# Patient Record
Sex: MALE | Race: BLACK OR AFRICAN AMERICAN | NOT HISPANIC OR LATINO | Employment: FULL TIME | URBAN - METROPOLITAN AREA
[De-identification: names, ages, dates, MRNs, and addresses within clinical notes are randomized per-mention and may not be internally consistent; named-entity substitution may affect disease eponyms.]

---

## 2019-09-18 ENCOUNTER — APPOINTMENT (EMERGENCY)
Dept: RADIOLOGY | Facility: HOSPITAL | Age: 64
DRG: 576 | End: 2019-09-18
Payer: COMMERCIAL

## 2019-09-18 ENCOUNTER — HOSPITAL ENCOUNTER (INPATIENT)
Facility: HOSPITAL | Age: 64
LOS: 3 days | Discharge: HOME | DRG: 576 | End: 2019-09-21
Attending: EMERGENCY MEDICINE | Admitting: INTERNAL MEDICINE
Payer: COMMERCIAL

## 2019-09-18 DIAGNOSIS — N40.0 ENLARGED PROSTATE: ICD-10-CM

## 2019-09-18 DIAGNOSIS — R78.81 BACTEREMIA DUE TO GRAM-NEGATIVE BACTERIA: ICD-10-CM

## 2019-09-18 DIAGNOSIS — N39.0 SEPSIS DUE TO URINARY TRACT INFECTION (HCC): ICD-10-CM

## 2019-09-18 DIAGNOSIS — A41.9 SEPSIS DUE TO URINARY TRACT INFECTION (HCC): ICD-10-CM

## 2019-09-18 DIAGNOSIS — N39.0 UTI (URINARY TRACT INFECTION): ICD-10-CM

## 2019-09-18 DIAGNOSIS — R65.20 SEVERE SEPSIS (HCC): Primary | ICD-10-CM

## 2019-09-18 DIAGNOSIS — N40.0 BPH (BENIGN PROSTATIC HYPERPLASIA): ICD-10-CM

## 2019-09-18 DIAGNOSIS — A41.9 SEVERE SEPSIS (HCC): Primary | ICD-10-CM

## 2019-09-18 PROBLEM — N17.9 AKI (ACUTE KIDNEY INJURY) (HCC): Status: ACTIVE | Noted: 2019-09-18

## 2019-09-18 PROBLEM — R65.21 SEPTIC SHOCK (HCC): Status: ACTIVE | Noted: 2019-09-18

## 2019-09-18 PROBLEM — E11.65 TYPE 2 DIABETES MELLITUS WITH HYPERGLYCEMIA, WITHOUT LONG-TERM CURRENT USE OF INSULIN (HCC): Status: ACTIVE | Noted: 2019-09-18

## 2019-09-18 PROBLEM — I95.9 HYPOTENSION: Status: ACTIVE | Noted: 2019-09-18

## 2019-09-18 PROBLEM — E78.5 HYPERLIPIDEMIA: Status: ACTIVE | Noted: 2019-09-18

## 2019-09-18 PROBLEM — F17.200 TOBACCO DEPENDENCE: Status: ACTIVE | Noted: 2019-09-18

## 2019-09-18 PROBLEM — E87.6 HYPOKALEMIA: Status: ACTIVE | Noted: 2019-09-18

## 2019-09-18 PROBLEM — E11.9 DIABETES (HCC): Status: ACTIVE | Noted: 2019-09-18

## 2019-09-18 LAB
ALBUMIN SERPL BCP-MCNC: 3 G/DL (ref 3.5–5)
ALBUMIN SERPL BCP-MCNC: 4.3 G/DL (ref 3.5–5)
ALP SERPL-CCNC: 136 U/L (ref 46–116)
ALP SERPL-CCNC: 91 U/L (ref 46–116)
ALT SERPL W P-5'-P-CCNC: 32 U/L (ref 12–78)
ALT SERPL W P-5'-P-CCNC: 38 U/L (ref 12–78)
ANION GAP SERPL CALCULATED.3IONS-SCNC: 12 MMOL/L (ref 4–13)
ANION GAP SERPL CALCULATED.3IONS-SCNC: 8 MMOL/L (ref 4–13)
APTT PPP: 24 SECONDS (ref 25–32)
AST SERPL W P-5'-P-CCNC: 27 U/L (ref 5–45)
AST SERPL W P-5'-P-CCNC: 33 U/L (ref 5–45)
ATRIAL RATE: 121 BPM
BACTERIA UR QL AUTO: ABNORMAL /HPF
BASOPHILS # BLD AUTO: 0.05 THOUSANDS/ΜL (ref 0–0.1)
BASOPHILS # BLD MANUAL: 0 THOUSAND/UL (ref 0–0.1)
BASOPHILS NFR BLD AUTO: 0 % (ref 0–1)
BASOPHILS NFR MAR MANUAL: 0 % (ref 0–1)
BILIRUB SERPL-MCNC: 1 MG/DL (ref 0.2–1)
BILIRUB SERPL-MCNC: 1.4 MG/DL (ref 0.2–1)
BILIRUB UR QL STRIP: NEGATIVE
BUN SERPL-MCNC: 10 MG/DL (ref 5–25)
BUN SERPL-MCNC: 9 MG/DL (ref 5–25)
CA-I BLD-SCNC: 1.05 MMOL/L (ref 1.12–1.32)
CALCIUM SERPL-MCNC: 7.3 MG/DL (ref 8.3–10.1)
CALCIUM SERPL-MCNC: 8.6 MG/DL (ref 8.3–10.1)
CHLORIDE SERPL-SCNC: 100 MMOL/L (ref 100–108)
CHLORIDE SERPL-SCNC: 108 MMOL/L (ref 100–108)
CLARITY UR: ABNORMAL
CO2 SERPL-SCNC: 23 MMOL/L (ref 21–32)
CO2 SERPL-SCNC: 27 MMOL/L (ref 21–32)
COLOR UR: YELLOW
CREAT SERPL-MCNC: 1.26 MG/DL (ref 0.6–1.3)
CREAT SERPL-MCNC: 1.43 MG/DL (ref 0.6–1.3)
EOSINOPHIL # BLD AUTO: 0 THOUSAND/ΜL (ref 0–0.61)
EOSINOPHIL # BLD MANUAL: 0 THOUSAND/UL (ref 0–0.4)
EOSINOPHIL NFR BLD AUTO: 0 % (ref 0–6)
EOSINOPHIL NFR BLD MANUAL: 0 % (ref 0–6)
ERYTHROCYTE [DISTWIDTH] IN BLOOD BY AUTOMATED COUNT: 12.4 % (ref 11.6–15.1)
ERYTHROCYTE [DISTWIDTH] IN BLOOD BY AUTOMATED COUNT: 12.5 % (ref 11.6–15.1)
ERYTHROCYTE [DISTWIDTH] IN BLOOD BY AUTOMATED COUNT: 12.9 % (ref 11.6–15.1)
EST. AVERAGE GLUCOSE BLD GHB EST-MCNC: 192 MG/DL
EST. AVERAGE GLUCOSE BLD GHB EST-MCNC: 192 MG/DL
FLUAV AG SPEC QL: NOT DETECTED
FLUBV AG SPEC QL: NOT DETECTED
GFR SERPL CREATININE-BSD FRML MDRD: 60 ML/MIN/1.73SQ M
GFR SERPL CREATININE-BSD FRML MDRD: 70 ML/MIN/1.73SQ M
GLUCOSE SERPL-MCNC: 203 MG/DL (ref 65–140)
GLUCOSE SERPL-MCNC: 215 MG/DL (ref 65–140)
GLUCOSE SERPL-MCNC: 228 MG/DL (ref 65–140)
GLUCOSE SERPL-MCNC: 249 MG/DL (ref 65–140)
GLUCOSE SERPL-MCNC: 260 MG/DL (ref 65–140)
GLUCOSE SERPL-MCNC: 277 MG/DL (ref 65–140)
GLUCOSE UR STRIP-MCNC: ABNORMAL MG/DL
HBA1C MFR BLD: 8.3 % (ref 4.2–6.3)
HBA1C MFR BLD: 8.3 % (ref 4.2–6.3)
HCT VFR BLD AUTO: 36 % (ref 36.5–49.3)
HCT VFR BLD AUTO: 36 % (ref 36.5–49.3)
HCT VFR BLD AUTO: 46.8 % (ref 36.5–49.3)
HGB BLD-MCNC: 12.3 G/DL (ref 12–17)
HGB BLD-MCNC: 12.5 G/DL (ref 12–17)
HGB BLD-MCNC: 15.8 G/DL (ref 12–17)
HGB UR QL STRIP.AUTO: ABNORMAL
IMM GRANULOCYTES # BLD AUTO: 0.05 THOUSAND/UL (ref 0–0.2)
IMM GRANULOCYTES NFR BLD AUTO: 0 % (ref 0–2)
INR PPP: 0.98 (ref 0.91–1.09)
KETONES UR STRIP-MCNC: ABNORMAL MG/DL
LACTATE SERPL-SCNC: 1.6 MMOL/L (ref 0.5–2)
LACTATE SERPL-SCNC: 2.1 MMOL/L (ref 0.5–2)
LACTATE SERPL-SCNC: 2.1 MMOL/L (ref 0.5–2)
LACTATE SERPL-SCNC: 2.6 MMOL/L (ref 0.5–2)
LACTATE SERPL-SCNC: 3.7 MMOL/L (ref 0.5–2)
LACTATE SERPL-SCNC: 3.8 MMOL/L (ref 0.5–2)
LEUKOCYTE ESTERASE UR QL STRIP: ABNORMAL
LIPASE SERPL-CCNC: 259 U/L (ref 73–393)
LIPASE SERPL-CCNC: 402 U/L (ref 73–393)
LYMPHOCYTES # BLD AUTO: 0.69 THOUSANDS/ΜL (ref 0.6–4.47)
LYMPHOCYTES # BLD AUTO: 0.89 THOUSAND/UL (ref 0.6–4.47)
LYMPHOCYTES # BLD AUTO: 17 % (ref 14–44)
LYMPHOCYTES NFR BLD AUTO: 6 % (ref 14–44)
MAGNESIUM SERPL-MCNC: 1.6 MG/DL (ref 1.6–2.6)
MCH RBC QN AUTO: 29.9 PG (ref 26.8–34.3)
MCH RBC QN AUTO: 30.4 PG (ref 26.8–34.3)
MCH RBC QN AUTO: 30.6 PG (ref 26.8–34.3)
MCHC RBC AUTO-ENTMCNC: 33.8 G/DL (ref 31.4–37.4)
MCHC RBC AUTO-ENTMCNC: 34.2 G/DL (ref 31.4–37.4)
MCHC RBC AUTO-ENTMCNC: 34.7 G/DL (ref 31.4–37.4)
MCV RBC AUTO: 88 FL (ref 82–98)
MCV RBC AUTO: 89 FL (ref 82–98)
MCV RBC AUTO: 89 FL (ref 82–98)
MONOCYTES # BLD AUTO: 0 THOUSAND/UL (ref 0–1.22)
MONOCYTES # BLD AUTO: 0.67 THOUSAND/ΜL (ref 0.17–1.22)
MONOCYTES NFR BLD AUTO: 5 % (ref 4–12)
MONOCYTES NFR BLD: 0 % (ref 4–12)
NEUTROPHILS # BLD AUTO: 10.93 THOUSANDS/ΜL (ref 1.85–7.62)
NEUTROPHILS # BLD MANUAL: 4.37 THOUSAND/UL (ref 1.85–7.62)
NEUTS BAND NFR BLD MANUAL: 19 % (ref 0–8)
NEUTS SEG NFR BLD AUTO: 64 % (ref 43–75)
NEUTS SEG NFR BLD AUTO: 89 % (ref 43–75)
NITRITE UR QL STRIP: POSITIVE
NON-SQ EPI CELLS URNS QL MICRO: ABNORMAL /HPF
NRBC BLD AUTO-RTO: 0 /100 WBCS
NRBC BLD AUTO-RTO: 0 /100 WBCS
P AXIS: 47 DEGREES
PH UR STRIP.AUTO: 5.5 [PH]
PLATELET # BLD AUTO: 195 THOUSANDS/UL (ref 149–390)
PLATELET # BLD AUTO: 208 THOUSANDS/UL (ref 149–390)
PLATELET # BLD AUTO: 252 THOUSANDS/UL (ref 149–390)
PLATELET BLD QL SMEAR: ADEQUATE
PMV BLD AUTO: 9.1 FL (ref 8.9–12.7)
PMV BLD AUTO: 9.3 FL (ref 8.9–12.7)
PMV BLD AUTO: 9.9 FL (ref 8.9–12.7)
POTASSIUM SERPL-SCNC: 3.4 MMOL/L (ref 3.5–5.3)
POTASSIUM SERPL-SCNC: 3.6 MMOL/L (ref 3.5–5.3)
PR INTERVAL: 160 MS
PROCALCITONIN SERPL-MCNC: 15.51 NG/ML
PROCALCITONIN SERPL-MCNC: 20.16 NG/ML
PROT SERPL-MCNC: 5.4 G/DL (ref 6.4–8.2)
PROT SERPL-MCNC: 7.7 G/DL (ref 6.4–8.2)
PROT UR STRIP-MCNC: ABNORMAL MG/DL
PROTHROMBIN TIME: 10.5 SECONDS (ref 9.8–12)
QRS AXIS: 52 DEGREES
QRSD INTERVAL: 86 MS
QT INTERVAL: 300 MS
QTC INTERVAL: 426 MS
RBC # BLD AUTO: 4.05 MILLION/UL (ref 3.88–5.62)
RBC # BLD AUTO: 4.09 MILLION/UL (ref 3.88–5.62)
RBC # BLD AUTO: 5.28 MILLION/UL (ref 3.88–5.62)
RBC #/AREA URNS AUTO: ABNORMAL /HPF
RBC MORPH BLD: NORMAL
RSV B RNA SPEC QL NAA+PROBE: NOT DETECTED
SODIUM SERPL-SCNC: 139 MMOL/L (ref 136–145)
SODIUM SERPL-SCNC: 139 MMOL/L (ref 136–145)
SP GR UR STRIP.AUTO: 1.01 (ref 1–1.03)
T WAVE AXIS: 5 DEGREES
TOTAL CELLS COUNTED SPEC: 100
TROPONIN I SERPL-MCNC: <0.02 NG/ML
TROPONIN I SERPL-MCNC: <0.02 NG/ML
TSH SERPL DL<=0.05 MIU/L-ACNC: 0.69 UIU/ML (ref 0.36–3.74)
UROBILINOGEN UR QL STRIP.AUTO: 1 E.U./DL
VENTRICULAR RATE: 121 BPM
WBC # BLD AUTO: 11.81 THOUSAND/UL (ref 4.31–10.16)
WBC # BLD AUTO: 12.39 THOUSAND/UL (ref 4.31–10.16)
WBC # BLD AUTO: 5.26 THOUSAND/UL (ref 4.31–10.16)
WBC #/AREA URNS AUTO: ABNORMAL /HPF

## 2019-09-18 PROCEDURE — 83735 ASSAY OF MAGNESIUM: CPT | Performed by: NURSE PRACTITIONER

## 2019-09-18 PROCEDURE — 87186 SC STD MICRODIL/AGAR DIL: CPT

## 2019-09-18 PROCEDURE — 71045 X-RAY EXAM CHEST 1 VIEW: CPT

## 2019-09-18 PROCEDURE — 93010 ELECTROCARDIOGRAM REPORT: CPT | Performed by: INTERNAL MEDICINE

## 2019-09-18 PROCEDURE — 85027 COMPLETE CBC AUTOMATED: CPT | Performed by: NURSE PRACTITIONER

## 2019-09-18 PROCEDURE — 85730 THROMBOPLASTIN TIME PARTIAL: CPT

## 2019-09-18 PROCEDURE — 36415 COLL VENOUS BLD VENIPUNCTURE: CPT

## 2019-09-18 PROCEDURE — 87040 BLOOD CULTURE FOR BACTERIA: CPT

## 2019-09-18 PROCEDURE — 80053 COMPREHEN METABOLIC PANEL: CPT | Performed by: NURSE PRACTITIONER

## 2019-09-18 PROCEDURE — 84145 PROCALCITONIN (PCT): CPT | Performed by: NURSE PRACTITIONER

## 2019-09-18 PROCEDURE — 83036 HEMOGLOBIN GLYCOSYLATED A1C: CPT | Performed by: NURSE PRACTITIONER

## 2019-09-18 PROCEDURE — 82948 REAGENT STRIP/BLOOD GLUCOSE: CPT

## 2019-09-18 PROCEDURE — 96361 HYDRATE IV INFUSION ADD-ON: CPT

## 2019-09-18 PROCEDURE — 87081 CULTURE SCREEN ONLY: CPT | Performed by: INTERNAL MEDICINE

## 2019-09-18 PROCEDURE — 87077 CULTURE AEROBIC IDENTIFY: CPT

## 2019-09-18 PROCEDURE — 81001 URINALYSIS AUTO W/SCOPE: CPT

## 2019-09-18 PROCEDURE — 87631 RESP VIRUS 3-5 TARGETS: CPT | Performed by: EMERGENCY MEDICINE

## 2019-09-18 PROCEDURE — 87086 URINE CULTURE/COLONY COUNT: CPT

## 2019-09-18 PROCEDURE — 99252 IP/OBS CONSLTJ NEW/EST SF 35: CPT | Performed by: INTERNAL MEDICINE

## 2019-09-18 PROCEDURE — 96365 THER/PROPH/DIAG IV INF INIT: CPT

## 2019-09-18 PROCEDURE — 84484 ASSAY OF TROPONIN QUANT: CPT | Performed by: NURSE PRACTITIONER

## 2019-09-18 PROCEDURE — 83690 ASSAY OF LIPASE: CPT | Performed by: NURSE PRACTITIONER

## 2019-09-18 PROCEDURE — 80053 COMPREHEN METABOLIC PANEL: CPT

## 2019-09-18 PROCEDURE — 94760 N-INVAS EAR/PLS OXIMETRY 1: CPT

## 2019-09-18 PROCEDURE — 99223 1ST HOSP IP/OBS HIGH 75: CPT | Performed by: INTERNAL MEDICINE

## 2019-09-18 PROCEDURE — 82330 ASSAY OF CALCIUM: CPT | Performed by: NURSE PRACTITIONER

## 2019-09-18 PROCEDURE — 99291 CRITICAL CARE FIRST HOUR: CPT | Performed by: INTERNAL MEDICINE

## 2019-09-18 PROCEDURE — 83605 ASSAY OF LACTIC ACID: CPT | Performed by: INTERNAL MEDICINE

## 2019-09-18 PROCEDURE — 85027 COMPLETE CBC AUTOMATED: CPT

## 2019-09-18 PROCEDURE — 85025 COMPLETE CBC W/AUTO DIFF WBC: CPT | Performed by: NURSE PRACTITIONER

## 2019-09-18 PROCEDURE — 74176 CT ABD & PELVIS W/O CONTRAST: CPT

## 2019-09-18 PROCEDURE — 99285 EMERGENCY DEPT VISIT HI MDM: CPT

## 2019-09-18 PROCEDURE — 85007 BL SMEAR W/DIFF WBC COUNT: CPT

## 2019-09-18 PROCEDURE — 83605 ASSAY OF LACTIC ACID: CPT

## 2019-09-18 PROCEDURE — 85610 PROTHROMBIN TIME: CPT

## 2019-09-18 PROCEDURE — 84443 ASSAY THYROID STIM HORMONE: CPT | Performed by: NURSE PRACTITIONER

## 2019-09-18 PROCEDURE — 83605 ASSAY OF LACTIC ACID: CPT | Performed by: NURSE PRACTITIONER

## 2019-09-18 PROCEDURE — 93005 ELECTROCARDIOGRAM TRACING: CPT

## 2019-09-18 RX ORDER — CEFTRIAXONE 1 G/50ML
1000 INJECTION, SOLUTION INTRAVENOUS ONCE
Status: COMPLETED | OUTPATIENT
Start: 2019-09-18 | End: 2019-09-18

## 2019-09-18 RX ORDER — MAGNESIUM SULFATE HEPTAHYDRATE 40 MG/ML
2 INJECTION, SOLUTION INTRAVENOUS ONCE
Status: COMPLETED | OUTPATIENT
Start: 2019-09-18 | End: 2019-09-18

## 2019-09-18 RX ORDER — POTASSIUM CHLORIDE 20 MEQ/1
40 TABLET, EXTENDED RELEASE ORAL ONCE
Status: COMPLETED | OUTPATIENT
Start: 2019-09-18 | End: 2019-09-18

## 2019-09-18 RX ORDER — ACETAMINOPHEN 325 MG/1
650 TABLET ORAL ONCE
Status: COMPLETED | OUTPATIENT
Start: 2019-09-18 | End: 2019-09-19

## 2019-09-18 RX ORDER — ONDANSETRON 2 MG/ML
4 INJECTION INTRAMUSCULAR; INTRAVENOUS EVERY 6 HOURS PRN
Status: DISCONTINUED | OUTPATIENT
Start: 2019-09-18 | End: 2019-09-21 | Stop reason: HOSPADM

## 2019-09-18 RX ORDER — SODIUM CHLORIDE, SODIUM GLUCONATE, SODIUM ACETATE, POTASSIUM CHLORIDE, MAGNESIUM CHLORIDE, SODIUM PHOSPHATE, DIBASIC, AND POTASSIUM PHOSPHATE .53; .5; .37; .037; .03; .012; .00082 G/100ML; G/100ML; G/100ML; G/100ML; G/100ML; G/100ML; G/100ML
125 INJECTION, SOLUTION INTRAVENOUS CONTINUOUS
Status: DISCONTINUED | OUTPATIENT
Start: 2019-09-18 | End: 2019-09-19

## 2019-09-18 RX ORDER — SODIUM CHLORIDE, SODIUM GLUCONATE, SODIUM ACETATE, POTASSIUM CHLORIDE, MAGNESIUM CHLORIDE, SODIUM PHOSPHATE, DIBASIC, AND POTASSIUM PHOSPHATE .53; .5; .37; .037; .03; .012; .00082 G/100ML; G/100ML; G/100ML; G/100ML; G/100ML; G/100ML; G/100ML
1000 INJECTION, SOLUTION INTRAVENOUS ONCE
Status: COMPLETED | OUTPATIENT
Start: 2019-09-18 | End: 2019-09-18

## 2019-09-18 RX ORDER — AZELASTINE 1 MG/ML
2 SPRAY, METERED NASAL 2 TIMES DAILY
Status: DISCONTINUED | OUTPATIENT
Start: 2019-09-18 | End: 2019-09-21 | Stop reason: HOSPADM

## 2019-09-18 RX ORDER — ACETAMINOPHEN 325 MG/1
650 TABLET ORAL EVERY 6 HOURS PRN
Status: DISCONTINUED | OUTPATIENT
Start: 2019-09-18 | End: 2019-09-19

## 2019-09-18 RX ORDER — CEFTRIAXONE 2 G/50ML
2000 INJECTION, SOLUTION INTRAVENOUS EVERY 24 HOURS
Status: DISCONTINUED | OUTPATIENT
Start: 2019-09-18 | End: 2019-09-20

## 2019-09-18 RX ORDER — LANOLIN ALCOHOL/MO/W.PET/CERES
6 CREAM (GRAM) TOPICAL
Status: DISCONTINUED | OUTPATIENT
Start: 2019-09-18 | End: 2019-09-21 | Stop reason: HOSPADM

## 2019-09-18 RX ORDER — CETIRIZINE HYDROCHLORIDE 10 MG/1
10 TABLET ORAL DAILY
Status: DISCONTINUED | OUTPATIENT
Start: 2019-09-18 | End: 2019-09-21 | Stop reason: HOSPADM

## 2019-09-18 RX ORDER — SODIUM CHLORIDE 9 MG/ML
125 INJECTION, SOLUTION INTRAVENOUS CONTINUOUS
Status: DISCONTINUED | OUTPATIENT
Start: 2019-09-18 | End: 2019-09-18

## 2019-09-18 RX ADMIN — MAGNESIUM SULFATE HEPTAHYDRATE 2 G: 40 INJECTION, SOLUTION INTRAVENOUS at 07:27

## 2019-09-18 RX ADMIN — SODIUM CHLORIDE 1000 ML: 0.9 INJECTION, SOLUTION INTRAVENOUS at 02:26

## 2019-09-18 RX ADMIN — POTASSIUM CHLORIDE 40 MEQ: 1500 TABLET, EXTENDED RELEASE ORAL at 04:08

## 2019-09-18 RX ADMIN — CEFTRIAXONE 1000 MG: 1 INJECTION, SOLUTION INTRAVENOUS at 01:15

## 2019-09-18 RX ADMIN — SODIUM CHLORIDE, SODIUM GLUCONATE, SODIUM ACETATE, POTASSIUM CHLORIDE AND MAGNESIUM CHLORIDE 1000 ML: 526; 502; 368; 37; 30 INJECTION, SOLUTION INTRAVENOUS at 05:38

## 2019-09-18 RX ADMIN — SODIUM CHLORIDE 125 ML/HR: 0.9 INJECTION, SOLUTION INTRAVENOUS at 04:07

## 2019-09-18 RX ADMIN — SODIUM CHLORIDE 1000 ML: 0.9 INJECTION, SOLUTION INTRAVENOUS at 02:07

## 2019-09-18 RX ADMIN — INSULIN LISPRO 2 UNITS: 100 INJECTION, SOLUTION INTRAVENOUS; SUBCUTANEOUS at 21:58

## 2019-09-18 RX ADMIN — INSULIN LISPRO 6 UNITS: 100 INJECTION, SOLUTION INTRAVENOUS; SUBCUTANEOUS at 16:23

## 2019-09-18 RX ADMIN — AZELASTINE HYDROCHLORIDE 2 SPRAY: 137 SPRAY, METERED NASAL at 14:06

## 2019-09-18 RX ADMIN — INSULIN LISPRO 2 UNITS: 100 INJECTION, SOLUTION INTRAVENOUS; SUBCUTANEOUS at 08:53

## 2019-09-18 RX ADMIN — SODIUM CHLORIDE 125 ML/HR: 0.9 INJECTION, SOLUTION INTRAVENOUS at 11:23

## 2019-09-18 RX ADMIN — NOREPINEPHRINE BITARTRATE 2 MCG/MIN: 1 INJECTION INTRAVENOUS at 08:18

## 2019-09-18 RX ADMIN — INSULIN LISPRO 3 UNITS: 100 INJECTION, SOLUTION INTRAVENOUS; SUBCUTANEOUS at 12:29

## 2019-09-18 RX ADMIN — SODIUM CHLORIDE, SODIUM GLUCONATE, SODIUM ACETATE, POTASSIUM CHLORIDE, MAGNESIUM CHLORIDE, SODIUM PHOSPHATE, DIBASIC, AND POTASSIUM PHOSPHATE 125 ML/HR: .53; .5; .37; .037; .03; .012; .00082 INJECTION, SOLUTION INTRAVENOUS at 12:34

## 2019-09-18 RX ADMIN — MELATONIN 6 MG: 3 TAB ORAL at 21:57

## 2019-09-18 RX ADMIN — SODIUM CHLORIDE 1000 ML: 0.9 INJECTION, SOLUTION INTRAVENOUS at 00:49

## 2019-09-18 RX ADMIN — CEFTRIAXONE 2000 MG: 2 INJECTION, SOLUTION INTRAVENOUS at 14:07

## 2019-09-18 RX ADMIN — SODIUM CHLORIDE, SODIUM GLUCONATE, SODIUM ACETATE, POTASSIUM CHLORIDE, MAGNESIUM CHLORIDE, SODIUM PHOSPHATE, DIBASIC, AND POTASSIUM PHOSPHATE 125 ML/HR: .53; .5; .37; .037; .03; .012; .00082 INJECTION, SOLUTION INTRAVENOUS at 20:58

## 2019-09-18 RX ADMIN — ACETAMINOPHEN 650 MG: 325 TABLET, FILM COATED ORAL at 00:44

## 2019-09-18 RX ADMIN — ACETAMINOPHEN 650 MG: 325 TABLET, FILM COATED ORAL at 18:38

## 2019-09-18 RX ADMIN — ENOXAPARIN SODIUM 40 MG: 40 INJECTION SUBCUTANEOUS at 09:14

## 2019-09-18 RX ADMIN — POTASSIUM CHLORIDE 40 MEQ: 1500 TABLET, EXTENDED RELEASE ORAL at 07:43

## 2019-09-18 NOTE — ASSESSMENT & PLAN NOTE
Lab Results   Component Value Date    HGBA1C 8 3 (H) 09/18/2019    HGBA1C 8 3 (H) 09/18/2019       Recent Labs     09/18/19  1622 09/18/19  2125 09/19/19  1132 09/19/19  1614   POCGLU 277* 228* 253* 184*       Blood Sugar Average: Last 72 hrs:  (P) 018 [id number]   · Patient's at-home metformin on hold secondary to lactic acidosis and FLIP  · Continue ISS + accuchecks   · 9/19 initiating long acting insulin with Lantus 10 units HS  · Constant carb diet

## 2019-09-18 NOTE — PLAN OF CARE
Problem: METABOLIC, FLUID AND ELECTROLYTES - ADULT  Goal: Electrolytes maintained within normal limits  Description  INTERVENTIONS:  - Monitor labs and assess patient for signs and symptoms of electrolyte imbalances  - Administer electrolyte replacement as ordered  - Monitor response to electrolyte replacements, including repeat lab results as appropriate  - Instruct patient on fluid and nutrition as appropriate  Outcome: Progressing  Goal: Fluid balance maintained  Description  INTERVENTIONS:  - Monitor labs   - Monitor I/O and WT  - Instruct patient on fluid and nutrition as appropriate  - Assess for signs & symptoms of volume excess or deficit  Outcome: Progressing  Goal: Glucose maintained within target range  Description  INTERVENTIONS:  - Monitor Blood Glucose as ordered  - Assess for signs and symptoms of hyperglycemia and hypoglycemia  - Administer ordered medications to maintain glucose within target range  - Assess nutritional intake and initiate nutrition service referral as needed  Outcome: Progressing     Problem: PAIN - ADULT  Goal: Verbalizes/displays adequate comfort level or baseline comfort level  Description  Interventions:  - Encourage patient to monitor pain and request assistance  - Assess pain using appropriate pain scale  - Administer analgesics based on type and severity of pain and evaluate response  - Implement non-pharmacological measures as appropriate and evaluate response  - Consider cultural and social influences on pain and pain management  - Notify physician/advanced practitioner if interventions unsuccessful or patient reports new pain  Outcome: Progressing     Problem: INFECTION - ADULT  Goal: Absence or prevention of progression during hospitalization  Description  INTERVENTIONS:  - Assess and monitor for signs and symptoms of infection  - Monitor lab/diagnostic results  - Monitor all insertion sites, i e  indwelling lines, tubes, and drains  - [place] appropriate cooling/warming therapies per order  - Administer medications as ordered  - Instruct and encourage patient and family to use good hand hygiene technique  - Identify and instruct in appropriate isolation precautions for identified infection/condition   Outcome: Progressing     Problem: SAFETY ADULT  Goal: Patient will remain free of falls  Description  INTERVENTIONS:  - Assess patient frequently for physical needs  -  Identify cognitive and physical deficits and behaviors that affect risk of falls    -  [place] fall precautions as indicated by assessment   - Educate patient/family on patient safety including physical limitations  - Instruct patient to call for assistance with activity based on assessment  - Modify environment to reduce risk of injury  - Consider OT/PT consult to assist with strengthening/mobility  Outcome: Progressing  Goal: Maintain or return to baseline ADL function  Description  INTERVENTIONS:  -  Assess patient's ability to carry out ADLs; assess patient's baseline for ADL function and identify physical deficits which impact ability to perform ADLs (bathing, care of mouth/teeth, toileting, grooming, dressing, etc )  - Assess/evaluate cause of self-care deficits   - Assess range of motion  - Assess patient's mobility; develop plan if impaired  - Assess patient's need for assistive devices and provide as appropriate  - Encourage maximum independence but intervene and supervise when necessary  - Involve family in performance of ADLs  - Assess for home care needs following discharge   - Consider OT consult to assist with ADL evaluation and planning for discharge  - Provide patient education as appropriate  Outcome: Progressing  Goal: Maintain or return mobility status to optimal level  Description  INTERVENTIONS:  - Assess patient's baseline mobility status (ambulation, transfers, stairs, etc )    - Identify cognitive and physical deficits and behaviors that affect mobility  - Identify mobility aids required to assist with transfers and/or ambulation (gait belt, sit-to-stand, lift, walker, cane, etc )  - [place] fall precautions as indicated by assessment  - Record patient progress and toleration of activity level on Mobility SBAR; progress patient to next Phase/Stage  - Instruct patient to call for assistance with activity based on assessment  - Consider rehabilitation consult to assist with strengthening/weightbearing, etc   Outcome: Progressing     Problem: DISCHARGE PLANNING  Goal: Discharge to home or other facility with appropriate resources  Description  INTERVENTIONS:  - Identify barriers to discharge w/patient and caregiver  - Arrange for needed discharge resources and transportation as appropriate  - Identify discharge learning needs (meds, wound care, etc )  - Arrange for interpretive services to assist at discharge as needed  - Refer to Case Management Department for coordinating discharge planning if the patient needs post-hospital services based on physician/advanced practitioner order or complex needs related to functional status, cognitive ability, or social support system  Outcome: Progressing     Problem: Knowledge Deficit  Goal: Patient/family/caregiver demonstrates understanding of disease process, treatment plan, medications, and discharge instructions  Description  Complete learning assessment and assess knowledge base    Interventions:  - Provide teaching at level of understanding  - Provide teaching via preferred learning methods  Outcome: Progressing

## 2019-09-18 NOTE — ASSESSMENT & PLAN NOTE
· In the setting of urosepsis/septic shock Patient creatinine on admission 1 43, now down to 1 2, 1 1  · Continue to monitor and trend renal indices  · Avoid nephrotoxin agents  · Patient states he does take NSAIDs from time to time for chronic back pain, but denies excessive use  He states he understands that is no good for her stomach her kidneys

## 2019-09-18 NOTE — QUICK NOTE
Repeat lactate 3 8, sepsis bolus continues to infuse at the time repeat lactate was drawn  Of note, patient is also on metformin as an outpatient  Will continue to closely monitor blood pressure and continue to trend lactate  CT of the A/P with no obstruction or hydronephrosis  Plan discussed with nursing

## 2019-09-18 NOTE — ASSESSMENT & PLAN NOTE
· Secondary to septic shock  · 9/19, initiated low-dose Levophed 2 mcg titrate for map greater than 65     · Levophed has been weaned off since yesterday afternoon around 1300

## 2019-09-18 NOTE — PROGRESS NOTES
Daily Progress Note - Transfer to [facility] Hew 60 y o  male MRN: [id number]  Unit/Bed#: ICU 04 Encounter: [id number]    ______________________________________________________________________  Assessment:     * UTI (urinary tract infection)  Assessment & Plan  · Patient admitted through the ED overnight with complaints of a 1 day history of burning with urination with fevers and chills  · Urine significant for nitrates, bacteria, and blood  · CT of the abdomen and pelvis demonstrated: 8mm calculus in the right kidney  No hydronephrosis  Note, CT without contrast is insensitive for the detection of pyelonephritis, correlate with urinalysis  · Emergency department patient was patient met SIRS criteria with fever, tachycardia, bandemia, and lactic acidosis with suspected infection  · Patient's lactic acid was 3 8, and he received 30 mils per kg volume resuscitation protocol  · Continue ceftriaxone IV  · For UTI guidelines given UTI symptoms plus sepsis, hypotension fever and chills likely pyelonephritis  · Cultures pending  · With past medical history of BPH and states he has had UTIs in the past but not frequently  He follows Dr Jermaine Jose in the past but has not seen him in about 10 years  Patient had 1 postvoid residual prior to transfer to the ICU of 300 mL  His most recent postvoid residual was 50 mL  · Once acute infection has resolved, patient should follow up for further recommendations and urological workup  Since metabolic syndrome is associated with higher prevalence and severity of BPH, aim for better blood glucose control, and healthy lifestyle  Septic shock ([facility] [address] )  Assessment & Plan  · Evidence by fever, tachycardia, tachypnea, Source likely UTI/pyelonephritis  · Overnight patient became hypotensive despite adequate volume resuscitation  · Lactic acid slow to clear  · Cultures pending  · Patient transferred to the ICU and low-dose Levophed infusion administered     · Follow-up cultures  · Procalcitonin pending  · Continue ceftriaxone  · Continue Isolyte at 125 mL/hour    Hypotension  Assessment & Plan  · Secondary to septic shock  · Continue low-dose Levophed 2 mcg titrate for map greater than 65  FLIP (acute kidney injury) ([facility] [address] )  Assessment & Plan  · In the setting of urosepsis/septic shock Patient creatinine on admission 1 43, now down to 1 2  · Unsure patient's baseline  · Continue to monitor and trend renal indices  · Avoid nephrotoxin agents  · Patient states he does take NSAIDs from time to time for chronic back pain, but denies excessive use  He states he understands that is no good for her stomach her kidneys  Type 2 diabetes mellitus with hyperglycemia, without long-term current use of insulin [facility])  Assessment & Plan  Lab Results   Component Value Date    HGBA1C 8 3 (H) 09/18/2019    HGBA1C 8 3 (H) 09/18/2019       Recent Labs     09/18/19  0720 09/18/19  1131   POCGLU 203* 260*       Blood Sugar Average: Last 72 hrs:  (P) 231 5   · Patient's at-home metformin on hold secondary to lactic acidosis and FLIP  · Continue subcutaneous insulin infusion  · Constant carb diet    Hyperlipidemia  Assessment & Plan  · Lab work from June 12 demonstrates a total cholesterol of 206, triglycerides 221, HDL 37,   · Continue at home medication  Hypokalemia  Assessment & Plan  · Replete as necessary      Plan:    Neuro:   · Delirium: CAM ICU positive no   · Pain controlled with:  P r n   Tylenol  · Pain score: none  · Regulate sleep/wake cycle    CV:   · Rhythm: Sinus Tachycardia  · Follow rhythm on telemetry    Pulm:   · Encourage coughing and deep breathing  · Encourage incentive spirometer    GI:   · Nutrition/diet plan:  Constant carb diet  · Stress ulcer prophylaxis: No prophylaxis needed  · Bowel regimen: None currently  · Last BM:  Prehospital    FEN:   · Fluid/Diuretic plan: No intervention  · Goal 24 hour fluid balance:  Euvolemic  · Electrolytes repleted: yes  · Goal: K >4 0, Mag >2 0, and Phos >3 0    :   · Indwelling [place] present: no   · Continue to monitor postvoid residuals  ID:   · Abx ordered: Ceftriaxone  · Day # 1   · Trend temps and WBC count    Heme:   · Hemoglobin stable at 12 5  · Trend hgb and plts    Endo:   · Glycemic control plan: Blood glucose not controlled  Start SQ insulin  Algorithm 4    Msk/Skin:  · Mobility goal:  Maintain skin integrity  · PT consult: no  · OT consult: no  · Frequent turning and off-loading    Family:  · Family updated within 24 hours: yes   · Family meeting planned today: no     VTE Prophylaxis:  · Pharmacologic Prophylaxis: Enoxaparin (Lovenox)  · Mechanical Prophylaxis: sequential compression device    Disposition: Transfer to Stepdown    Code Status: Level 1 - Full Code    Counseling / Coordination of Care  Total Critical Care time spent 40 minutes excluding procedures, teaching and family updates  ______________________________________________________________________    HPI/24hr events:   Patient was admitted overnight and treated for urosepsis  In the emergency department lactic acid was 3 8  He was tachycardic and febrile Patient is otherwise hemodynamically stable  Patient received volume resuscitation protocol with 30 mL/kilogram   He was transferred to the floor  Despite adequate volume resuscitation patient became hypotensive, now requiring low-dose pressors  ______________________________________________________________________    Physical Exam:   Physical Exam   Constitutional: He is oriented to person, place, and time  He appears well-developed and well-nourished  HENT:   Head: Normocephalic and atraumatic  Eyes: Pupils are equal, round, and reactive to light  Right eye glaucoma, lid slightly drooped   Neck: Normal range of motion  No JVD present  Cardiovascular: Normal rate and regular rhythm  Pulmonary/Chest: Effort normal    Abdominal: Soft   Bowel sounds are normal    Musculoskeletal: Normal range of motion  Neurological: He is alert and oriented to person, place, and time  Skin: Skin is warm and dry  Capillary refill takes less than 2 seconds  ______________________________________________________________________  Vitals:    19 1700 19 1800 19 1900 19 1930   BP: 114/56 122/61 115/56    BP Location:       Pulse: 104 (!) 107 (!) 106    Resp: 22 20 22    Temp:  100 5 °F (38 1 °C)  99 3 °F (37 4 °C)   TempSrc:  Temporal  Temporal   SpO2:       Weight:       Height:                  Temperature:   Temp (24hrs), Av 6 °F (37 6 °C), Min:98 4 °F (36 9 °C), Max:102 3 °F (39 1 °C)    Current Temperature: 99 3 °F (37 4 °C)    Weights:   IBW: 71 85 kg    Body mass index is 31 16 kg/m²  Weight (last 2 days)     Date/Time   Weight    19 0300   97 1 (214 07)    19 0035   93 4 (206)              Hemodynamic Monitoring:  N/A       Non-Invasive/Invasive Ventilation Settings:  Respiratory    Lab Data (Last 4 hours)    None         O2/Vent Data (Last 4 hours)    None              No results found for: PHART, YII6DIY, PO2ART, VTH5IFJ, P2WSEPZF, BEART, SOURCE  SpO2: SpO2: 96 %    Intake and Outputs:  I/O        07 -  0700  07 -  0700  07 -  0700    P  O   120     I V  (mL/kg)  1314 6 (13 5)     IV Piggyback  3050     Total Intake(mL/kg)  4484 6 (46 2)     Urine (mL/kg/hr)  1000 500 (3 6)    Total Output  1000 500    Net  +3484 6 -500           Unmeasured Urine Occurrence  2 x           Nutrition:        Diet Orders   (From admission, onward)             Start     Ordered    19 0859  Room Service  Once     Question:  Type of Service  Answer:  Room Service-Appropriate    19 0858    19 0857  Diet Tomas/CHO Controlled; Consistent Carbohydrate Diet Level 2 (5 carb servings/75 grams CHO/meal)  Diet effective now     Question Answer Comment   Diet Type Tomas/CHO Controlled    Tomas/CHO Controlled Consistent Carbohydrate Diet Level 2 (5 carb servings/75 grams CHO/meal)    RD to adjust diet per protocol? Yes        09/18/19 0856                Labs:   Results from last 7 days   Lab Units 09/18/19  0513 09/18/19  0044   WBC Thousand/uL 12 39*  11 81* 5 26   HEMOGLOBIN g/dL 12 5  12 3 15 8   HEMATOCRIT % 36 0*  36 0* 46 8   PLATELETS Thousands/uL 208  195 252   NEUTROS PCT % 89*  --    MONOS PCT % 5  --    MONO PCT %  --  0*     Results from last 7 days   Lab Units 09/18/19  0513 09/18/19  0044   SODIUM mmol/L 139 139   POTASSIUM mmol/L 3 6 3 4*   CHLORIDE mmol/L 108 100   CO2 mmol/L 23 27   BUN mg/dL 9 10   CREATININE mg/dL 1 26 1 43*   CALCIUM mg/dL 7 3* 8 6   ALK PHOS U/L 91 136*   ALT U/L 32 38   AST U/L 27 33     Results from last 7 days   Lab Units 09/18/19  0513   MAGNESIUM mg/dL 1 6     No results found for: PHOS   Results from last 7 days   Lab Units 09/18/19  0044   INR  0 98   PTT seconds 24*     0   Lab Value Date/Time    TROPONINI <0 02 09/18/2019 1130    TROPONINI <0 02 09/18/2019 0927     Results from last 7 days   Lab Units 09/18/19  1130 09/18/19  0928 09/18/19  0703   LACTIC ACID mmol/L 1 6 2 1* 2 6*     ABG:No results found for: PHART, SUY8BCK, PO2ART, JEU6ZJH, O7EWHOKF, BEART, SOURCE    Imaging:  CT renal stone study abdomen pelvis without contrast   Final Result      8mm calculus in the right kidney  No hydronephrosis  Note, CT without contrast is insensitive for the detection of pyelonephritis, correlate with urinalysis  Workstation performed: [id number]         XR chest 1 view portable   ED Interpretation   nad      Final Result      No acute cardiopulmonary disease  Workstation performed: [id number]          I have personally reviewed pertinent reports  EKG:  Sinus tachycardia on monitor    Micro:  No results found for: Trever Caller, SPUTUMCULTUR    Allergies:    Allergies   Allergen Reactions    Cat Hair Extract      Reaction Date: 03Dec2008;     Dust Mite Extract      Reaction Date: [id number] Ort Pollen Allergen      Reaction Date: 03Dec2008;     Tree Extract      Reaction Date: 03Dec2008; Medications:   Scheduled Meds:    Current Facility-Administered Medications:  acetaminophen 650 mg Oral Q6H PRN BRIGID Loo    azelastine 2 spray Each Nare BID BRIGID Cody    cefTRIAXone 2,000 mg Intravenous Q24H BRIGID Lindsay Last Rate: Stopped (09/18/19 1437)   cetirizine 10 mg Oral Daily BRIGID Cody    enoxaparin 40 mg Subcutaneous Daily Radha R BRIGID Schroeder    insulin lispro 1-5 Units Subcutaneous HS Radha R BRIGID Schroeder    insulin lispro 2-12 Units Subcutaneous TID AC BRIGID Cody    multi-electrolyte 125 mL/hr Intravenous Continuous BRIGID Lindsay Last Rate: 125 mL/hr (09/18/19 1234)   norepinephrine 1-30 mcg/min Intravenous Titrated BRIGID Loo Last Rate: Stopped (09/18/19 1305)   ondansetron 4 mg Intravenous Q6H PRN BRIGID Cody      Continuous Infusions:    multi-electrolyte 125 mL/hr Last Rate: 125 mL/hr (09/18/19 1234)   norepinephrine 1-30 mcg/min Last Rate: Stopped (09/18/19 1305)     PRN Meds:    acetaminophen 650 mg Q6H PRN   ondansetron 4 mg Q6H PRN       Invasive lines and devices:   Invasive Devices     Peripheral Intravenous Line            Peripheral IV 09/18/19 Right Antecubital less than 1 day    Peripheral IV 09/18/19 Right Hand less than 1 day                   SIGNATURE: BRIGID Lindsay  DATE: September 18, 2019

## 2019-09-18 NOTE — SOCIAL WORK
LOS 0  GLOS 4 8  Pt is not a bundle or a 30 day readmission  Met with pt who provided information for initial assessment  Pt stated he lives with family  Prior to admission, pt independent with ambulation and ADLs  No use of assistive devices  No hx of VNA  Pt stated he is employed in [employer]  PCP- Dr Joe Junior  Preferred pharmacy- [facility] on [address] in [place]  Pt stated he still drives and denies any barriers to obtaining or affording meds  Pt does not have an advanced directive and declined any information at this time  Pt anticipates to return home when medically cleared with no discharge needs anticipated  CM reviewed discharge planning process including the following: identifying caregivers at home, preference for d/c planning needs,  availability of treatment team to discuss questions or concerns patient and/or family may have regarding plan of care and discharge planning   CM will continue to follow for care coordination and update assessment as appropriate

## 2019-09-18 NOTE — ASSESSMENT & PLAN NOTE
· Patient admitted through the ED overnight with complaints of a 1 day history of burning with urination with fevers and chills  · Urine significant for nitrates, bacteria, and blood  · CT of the abdomen and pelvis demonstrated: 8mm calculus in the right kidney  No hydronephrosis  Note, CT without contrast is insensitive for the detection of pyelonephritis, correlate with urinalysis  Patient noted to have enlarged prostate  · Emergency department patient was patient met SIRS criteria with fever, tachycardia, bandemia, and lactic acidosis with suspected infection  · Patient's lactic acid was 3 8, and he received 30 mils per kg volume resuscitation protocol, lactic clear to 1 6    · Continue ceftriaxone IV

## 2019-09-18 NOTE — ASSESSMENT & PLAN NOTE
Patient woke up tonight from sleep today with fevers/chills  Patient had some dysuria yesterday after holding his urine for several hours  Patient chronic low back pain, no change  No nausea, vomiting, abdominal pain  patient reports some difficulty/hesitancy today  Sepsis as evidenced by fever, tachycardia, bandemia, lactic acidosis  Urinalysis significant for leukocytes, nitrites, bacteria and blood      · Admit to medicine  · Continue rocephin  · Received sepsis bolus in ER, continue IV hydration  · Trend lactate until normal  · Send procalcitonin and trend  · Blood and urine cultures pending  · CT of the abdomen and pelvis pending

## 2019-09-18 NOTE — ED PROVIDER NOTES
History  Chief Complaint   Patient presents with    Fever - 9 weeks to 74 years     Pt states he woke up with shivering, pt states he has no other symptoms  Pt in ER with c/o sudden onset of chills that awakened him  He was unaware that he had a fever prior to arrival  Pt c/o intermittent dysuria x 2 days  He denies back pain or abd pain  History provided by:  Patient   used: No    Fever - 9 weeks to 74 years   Onset quality:  Sudden  Timing:  Constant  Progression:  Worsening  Chronicity:  New  Relieved by:  Nothing  Worsened by:  Nothing  Ineffective treatments:  None tried  Associated symptoms: chills and dysuria    Associated symptoms: no chest pain, no cough, no diarrhea, no nausea, no rash and no vomiting        Prior to Admission Medications   Prescriptions Last Dose Informant Patient Reported? Taking?   metFORMIN (GLUCOPHAGE) 500 mg tablet 9/18/2019 at Unknown time  Yes Yes   Sig: Take 500 mg by mouth 2 (two) times a day with meals      Facility-Administered Medications: None       Past Medical History:   Diagnosis Date    Diabetes mellitus ([facility] [address] )     Glaucoma        Past Surgical History:   Procedure Laterality Date    EYE SURGERY         Family History   Problem Relation Age of Onset    Diabetes Mother     Heart attack Mother     No Known Problems Brother     No Known Problems Brother     No Known Problems Brother      I have reviewed and agree with the history as documented  Social History     Tobacco Use    Smoking status: Current Some Day Smoker    Smokeless tobacco: Never Used    Tobacco comment: rarely cigar   Substance Use Topics    Alcohol use: Never     Frequency: Never    Drug use: Never        Review of Systems   Constitutional: Positive for chills and fever  Respiratory: Negative for cough, shortness of breath and wheezing  Cardiovascular: Negative for chest pain and palpitations     Gastrointestinal: Negative for abdominal pain, constipation, diarrhea, nausea and vomiting  Genitourinary: Positive for dysuria  Negative for flank pain, hematuria and urgency  Musculoskeletal: Negative for back pain  Skin: Negative for color change and rash  All other systems reviewed and are negative  Physical Exam  Physical Exam   Constitutional: He is oriented to person, place, and time  He appears well-developed and well-nourished  HENT:   Head: Normocephalic and atraumatic  Eyes: Pupils are equal, round, and reactive to light  EOM are normal    Cardiovascular: Normal rate, regular rhythm and normal heart sounds  Pulmonary/Chest: Effort normal and breath sounds normal    Abdominal: Soft  Bowel sounds are normal  He exhibits no distension and no mass  There is no tenderness  There is no rebound and no guarding  Musculoskeletal:   No CVA tenderness   Neurological: He is alert and oriented to person, place, and time  Skin: Skin is warm and dry  Psychiatric: He has a normal mood and affect  His behavior is normal  Judgment and thought content normal    Nursing note and vitals reviewed        Vital Signs  ED Triage Vitals   Temperature Pulse Respirations Blood Pressure SpO2   09/18/19 0035 09/18/19 0039 09/18/19 0039 09/18/19 0039 09/18/19 0039   (!) 101 7 °F (38 7 °C) (!) 124 18 142/67 99 %      Temp Source Heart Rate Source Patient Position - Orthostatic VS BP Location FiO2 (%)   09/18/19 0035 09/18/19 0039 09/18/19 0039 09/18/19 0039 --   Tympanic Monitor Lying Left arm       Pain Score       09/18/19 0030       No Pain           Vitals:    09/18/19 0715 09/18/19 0723 09/18/19 0739 09/18/19 0740   BP:  (!) 88/49 90/52 90/52   Pulse: 90 93 95 95   Patient Position - Orthostatic VS:             Visual Acuity  Visual Acuity      Most Recent Value   L Pupil Size (mm)  3          ED Medications  Medications   sodium chloride 0 9 % infusion (125 mL/hr Intravenous New Bag 9/18/19 3619)   ondansetron (ZOFRAN) injection 4 mg (has no administration in time range)   enoxaparin (LOVENOX) subcutaneous injection 40 mg (has no administration in time range)   insulin lispro (HumaLOG) 100 units/mL subcutaneous injection 1-6 Units (has no administration in time range)   insulin lispro (HumaLOG) 100 units/mL subcutaneous injection 1-6 Units (has no administration in time range)   cefTRIAXone (ROCEPHIN) IVPB (premix) 2,000 mg (has no administration in time range)   magnesium sulfate 2 g/50 mL IVPB (premix) 2 g (2 g Intravenous New Bag 9/18/19 0727)   norepinephrine (LEVOPHED) 4 mg (STANDARD CONCENTRATION) IV in sodium chloride 0 9% 250 mL (has no administration in time range)   sodium chloride 0 9 % bolus 1,000 mL (0 mL Intravenous Stopped 9/18/19 0205)   acetaminophen (TYLENOL) tablet 650 mg (650 mg Oral Given 9/18/19 0044)   cefTRIAXone (ROCEPHIN) IVPB (premix) 1,000 mg (0 mg Intravenous Stopped 9/18/19 0201)   sodium chloride 0 9 % bolus 1,000 mL (0 mL Intravenous Stopped 9/18/19 0400)   sodium chloride 0 9 % bolus 1,000 mL (0 mL Intravenous Stopped 9/18/19 0331)   potassium chloride (K-DUR,KLOR-CON) CR tablet 40 mEq (40 mEq Oral Given 9/18/19 0408)   multi-electrolyte (ISOLYTE-S PH 7 4) bolus 1,000 mL (0 mL Intravenous Stopped 9/18/19 0638)   potassium chloride (K-DUR,KLOR-CON) CR tablet 40 mEq (40 mEq Oral Given 9/18/19 0743)       Diagnostic Studies  Results Reviewed     Procedure Component Value Units Date/Time    Lactic acid x2 [[id number]]  (Abnormal) Collected:  09/18/19 0301    Lab Status:  Final result Specimen:  Blood from Arm, Right Updated:  09/18/19 0330     LACTIC ACID 3 8 mmol/L     Narrative:       Result may be elevated if tourniquet was used during collection      Urine Microscopic [[id number]]  (Abnormal) Collected:  09/18/19 0126    Lab Status:  Final result Specimen:  Urine, Clean Catch Updated:  09/18/19 0151     RBC, UA 2-4 /hpf      WBC, UA 20-30 /hpf      Epithelial Cells None Seen /hpf      Bacteria, UA Moderate /hpf     Urine culture [[id number]] Collected:  09/18/19 0126    Lab Status: In process Specimen:  Urine, Clean Catch Updated:  09/18/19 0151    UA w Reflex to Microscopic w Reflex to Culture [[id number]]  (Abnormal) Collected:  09/18/19 0126    Lab Status:  Final result Specimen:  Urine, Clean Catch Updated:  09/18/19 0144     Color, UA Yellow     Clarity, UA Slightly Cloudy     Specific [place], UA 1 010     pH, UA 5 5     Leukocytes, UA Small     Nitrite, UA Positive     Protein, UA Trace mg/dl      Glucose,  (1/10%) mg/dl      Ketones, UA 15 (1+) mg/dl      Urobilinogen, UA 1 0 E U /dl      Bilirubin, UA Negative     Blood, UA Moderate    Blood culture #1 [[id number]] Collected:  09/18/19 0112    Lab Status: In process Specimen:  Blood from Arm, Left Updated:  09/18/19 0117    CBC and differential [[id number]] Collected:  09/18/19 0044    Lab Status:  Final result Specimen:  Blood from Arm, Right Updated:  09/18/19 0117     WBC 5 26 Thousand/uL      RBC 5 28 Million/uL      Hemoglobin 15 8 g/dL      Hematocrit 46 8 %      MCV 89 fL      MCH 29 9 pg      MCHC 33 8 g/dL      RDW 12 4 %      MPV 9 1 fL      Platelets 375 Thousands/uL      nRBC 0 /100 WBCs     Narrative: This is an appended report  These results have been appended to a previously verified report  Lactic acid x2 [[id number]]  (Abnormal) Collected:  09/18/19 0044    Lab Status:  Final result Specimen:  Blood from Arm, Right Updated:  09/18/19 0112     LACTIC ACID 3 7 mmol/L     Narrative:       Result may be elevated if tourniquet was used during collection      Comprehensive metabolic panel [[id number]]  (Abnormal) Collected:  09/18/19 0044    Lab Status:  Final result Specimen:  Blood from Arm, Right Updated:  09/18/19 0108     Sodium 139 mmol/L      Potassium 3 4 mmol/L      Chloride 100 mmol/L      CO2 27 mmol/L      ANION GAP 12 mmol/L      BUN 10 mg/dL      Creatinine 1 43 mg/dL      Glucose 215 mg/dL      Calcium 8 6 mg/dL      AST 33 U/L      ALT 38 U/L      Alkaline Phosphatase 136 U/L      Total Protein 7 7 g/dL      Albumin 4 3 g/dL      Total Bilirubin 1 40 mg/dL      eGFR 60 ml/min/1 73sq m     Narrative:       [place] guidelines for Chronic Kidney Disease (CKD):     Stage 1 with normal or high GFR (GFR > 90 mL/min/1 73 square meters)    Stage 2 Mild CKD (GFR = 60-89 mL/min/1 73 square meters)    Stage 3A Moderate CKD (GFR = 45-59 mL/min/1 73 square meters)    Stage 3B Moderate CKD (GFR = 30-44 mL/min/1 73 square meters)    Stage 4 Severe CKD (GFR = 15-29 mL/min/1 73 square meters)    Stage 5 End Stage CKD (GFR <15 mL/min/1 73 square meters)  Note: GFR calculation is accurate only with a steady state creatinine    APTT [[id number]]  (Abnormal) Collected:  09/18/19 0044    Lab Status:  Final result Specimen:  Blood from Arm, Right Updated:  09/18/19 0105     PTT 24 seconds     Protime-INR [[id number]]  (Normal) Collected:  09/18/19 0044    Lab Status:  Final result Specimen:  Blood from Arm, Right Updated:  09/18/19 0105     Protime 10 5 seconds      INR 0 98    Influenza A/B and RSV by PCR [[id number]] Collected:  09/18/19 0050    Lab Status: In process Specimen:  Nasopharyngeal Swab Updated:  09/18/19 0053    Blood culture #2 [[id number]] Collected:  09/18/19 0044    Lab Status: In process Specimen:  Blood from Arm, Right Updated:  09/18/19 0049                 CT renal stone study abdomen pelvis without contrast   Final Result by Connie Butler MD (09/18 0303)      8mm calculus in the right kidney  No hydronephrosis  Note, CT without contrast is insensitive for the detection of pyelonephritis, correlate with urinalysis  Workstation performed: [id number]         XR chest 1 view portable   ED Interpretation by Skyla Naylor DO (09/18 0126)   nad      Final Result by Alisha Flores MD (09/18 9795)      No acute cardiopulmonary disease              Workstation performed: [id number]                    Procedures  ECG 12 Lead Documentation Only  Date/Time: 9/18/2019 12:49 AM  Performed by: Ld Collado DO  Authorized by: Ld Collado DO     Indications / Diagnosis:  Sirs  ECG reviewed by me, the ED Provider: yes    Patient location:  ED  Previous ECG:     Previous ECG:  Unavailable    Comparison to cardiac monitor: Yes    Interpretation:     Interpretation: non-specific    Rate:     ECG rate:  121bpm    ECG rate assessment: tachycardic    Rhythm:     Rhythm: sinus tachycardia    Ectopy:     Ectopy: none    QRS:     QRS axis:  Normal    QRS intervals:  Normal  Conduction:     Conduction: normal    ST segments:     ST segments:  Normal  T waves:     T waves: normal    CriticalCare Time  Performed by: Ld Collado DO  Authorized by: Ld Collado DO     Critical care provider statement:     Critical care time (minutes):  35    Critical care time was exclusive of:  Separately billable procedures and treating other patients and teaching time    Critical care was necessary to treat or prevent imminent or life-threatening deterioration of the following conditions:  Sepsis    Critical care was time spent personally by me on the following activities:  Blood draw for specimens, obtaining history from patient or surrogate, discussions with consultants, evaluation of patient's response to treatment, examination of patient, development of treatment plan with patient or surrogate, interpretation of cardiac output measurements, ordering and performing treatments and interventions, ordering and review of laboratory studies, ordering and review of radiographic studies and re-evaluation of patient's condition    I assumed direction of critical care for this patient from another provider in my specialty: no             ED Course                   Initial Sepsis Screening     Row Name 09/18/19 0045                Is the patient's history suggestive of a new or worsening infection?   (!) Yes (Proceed)  -OO Suspected source of infection  urinary tract infection  -OO        Are two or more of the following signs & symptoms of infection both present and new to the patient?  --        Indicate SIRS criteria  Hyperthemia > 38 3C (100 9F); Tachycardia > 90 bpm;Tachypnea > 20 resp per min  -OO        If the answer is yes to both questions, suspicion of sepsis is present  --        If severe sepsis is present AND tissue hypoperfusion perists in the hour after fluid resuscitation or lactate > 4, the patient meets criteria for SEPTIC SHOCK  --        Are any of the following organ dysfunction criteria present within 6 hours of suspected infection and SIRS criteria that are NOT considered to be chronic conditions? (!) Yes  -OO        Organ dysfunction  Lactate > 2 0 mmol/L  -OO        Date of presentation of severe sepsis  09/18/19  -OO        Time of presentation of severe sepsis  0045  -OO        Tissue hypoperfusion persists in the hour after crystalloid fluid administration, evidenced, by either:  --        Was hypotension present within one hour of the conclusion of crystalloid fluid administration? No  -OO        Date of presentation of septic shock  --        Time of presentation of septic shock  --          User Key  (r) = Recorded By, (t) = Taken By, (c) = Cosigned By    [address] Name Provider Type    OO Dominick Escalante, DO Physician           Default Flowsheet Data (last 720 hours)      Sepsis Reassess     Row Name 09/18/19 0710                   Repeat Volume Status and Tissue Perfusion Assessment Performed    Repeat Volume Status and Tissue Perfusion Assessment Performed  Yes  -MM           Volume Status and Tissue Perfusion Post Fluid Resuscitation * Must Document All *    Vital Signs Reviewed (HR, RR, BP, T)  Yes  -MM        Shock Index Reviewed  Yes  -MM        Arterial Oxygen Saturation Reviewed (POx, SaO2 or SpO2)  --        Cardio  Normal S1/S2; Regular rate and rhythm  -MM        Pulmonary  Normal effort;Clear to auscultation  -MM        Capillary Refill  Brisk  -MM        Peripheral Pulses  Radial;Dorsalis Pedis  -MM        Peripheral Pulse  +2  -MM        Dorsalis Pedis  +2  -MM        Skin  Warm;Dry;Normal  -MM        Urine output assessed  Adequate  -MM           *OR*   Intensive Monitoring- Must Document One of the Following Four *:    Vital Signs Reviewed  --        * Central Venous Pressure (CVP or RAP)  --        * Central Venous Oxygen (SVO2, ScvO2 or Oxygen saturation via central catheter)  --        * Bedside Cardiovascular US in IVC diameter and % collapse  --        * Passive Leg Raise OR Crystalloid Challenge  --          User Key  (r) = Recorded By, (t) = Taken By, (c) = Cosigned By    Initials Name Provider Type    MM Johanny Player, [address] Galan Nurse Practitioner                MDM  Number of Diagnoses or Management Options  Severe sepsis [facility]):   UTI (urinary tract infection):   Diagnosis management comments: Pt with severe sepsis secondary to a UTI  Sepsis alert initiated -abx and fluid bolus completed  Pt is hemodynamically stable at the time of dispo to SLIM          Amount and/or Complexity of Data Reviewed  Clinical lab tests: ordered and reviewed  Tests in the radiology section of CPT®: ordered and reviewed    Risk of Complications, Morbidity, and/or Mortality  Presenting problems: high  Diagnostic procedures: high  Management options: high    Patient Progress  Patient progress: stable      Disposition  Final diagnoses:   Severe sepsis ([facility] [address] )   UTI (urinary tract infection)     Time reflects when diagnosis was documented in both MDM as applicable and the Disposition within this note     Time User Action Codes Description Comment    9/18/2019  2:16 AM Manuel MENG Add [A41 9,  R65 20] Severe sepsis ([facility] [address] )     9/18/2019  5:21 AM Alfa Franco Add [A41 9,  N39 0] Sepsis due to urinary tract infection ([facility] [address] )     9/18/2019  7:39 AM Manuel MENG Add [N39 0] UTI (urinary tract infection)       ED Disposition     ED Disposition Condition Date/Time Comment    Admit Stable Wed Sep 18, 2019  2:16 AM Case was discussed with Michael Holder NP and the patient's admission status was agreed to be Admission Status: inpatient status to the service of Dr Crissy Shultz   Follow-up Information    None         Current Discharge Medication List      CONTINUE these medications which have NOT CHANGED    Details   metFORMIN (GLUCOPHAGE) 500 mg tablet Take 500 mg by mouth 2 (two) times a day with meals           No discharge procedures on file      ED Provider  Electronically Signed by           Skyla Naylor,   09/18/19 [address] [place] [address],   09/18/19 6500

## 2019-09-18 NOTE — PROGRESS NOTES
Pj Penaloza NP made aware of pts  BP post bolus per sepsis protocol, 88/51 MAP 63, recycled 3 times results still low, Critical care consulted, Ilda Agosto NP at the bedside to see pt , Isolyte 1 liter bolus ordered and started, pt   Denies dizziness or lightheadedness, no complaints at this time, temp 99 3, lactate trending down, NP aware

## 2019-09-18 NOTE — ASSESSMENT & PLAN NOTE
· Evidence by fever, tachycardia, tachypnea, Source likely UTI/pyelonephritis  · Overnight patient became hypotensive despite adequate volume resuscitation    · Lactic acid slow to clear  · Cultures pending  · Patient transferred to the ICU and low-dose Levophed infusion administered, able to be titrated off   · Follow-up cultures: urine and blood cultures with E coli  · Procalcitonin elevated up to 20 1, trending down today 18 88  · Continue ceftriaxone

## 2019-09-18 NOTE — SEPSIS NOTE
Sepsis Note   Kunal Cons 60 y o  male MRN: [id number]  Unit/Bed#: [facility] Encounter: [id number]      qSOFA     Row Name 09/18/19 07:23:19 09/18/19 0700 09/18/19 0645 09/18/19 0630 09/18/19 0615    Altered mental status GCS < 15  --  --  --  --  --    Respiratory Rate > / =22  --  --  1  0  1    Systolic BP < / =210  1  1  1  1  1    Q Sofa Score  2  2  2  1  2    Row Name 09/18/19 0600 09/18/19 0543 09/18/19 0540 09/18/19 0530 09/18/19 05:17:50    Altered mental status GCS < 15  --  --  --  --  --    Respiratory Rate > / =22  1  --  --  --  0    Systolic BP < / =647  1  1  1  1  1    Q Sofa Score  2  1  1  1  1    Row Name 09/18/19 05:10:40 09/18/19 0500 09/18/19 04:46:36 09/18/19 04:33:51 09/18/19 04:19:12    Altered mental status GCS < 15  --  --  --  --  --    Respiratory Rate > / =22  0  0  0  0  0    Systolic BP < / =232  1  1  1  1  1    Q Sofa Score  1  1  1  1  1    Row Name 09/18/19 0400 09/18/19 0322 09/18/19 0321 09/18/19 0215 09/18/19 0200    Altered mental status GCS < 15  --  --  --  --  --    Respiratory Rate > / =22  0  0  --  1  1    Systolic BP < / =784  1  --  0  0  0    Q Sofa Score  1  0  1  1  1    Row Name 09/18/19 0130 09/18/19 0115 09/18/19 0100 09/18/19 0045 09/18/19 0039    Altered mental status GCS < 15  --  --  --  --  --    Respiratory Rate > / =22  --  1  1  0  0    Systolic BP < / =180  0  --  --  0  0    Q Sofa Score  1  1  1  0  0    Row Name 09/18/19 0030                Altered mental status GCS < 15  0        Respiratory Rate > / =22  --        Systolic BP < / =310  --        Q Sofa Score  --            Initial Sepsis Screening     Row Name 09/18/19 0045                Is the patient's history suggestive of a new or worsening infection?   (!) Yes (Proceed)  -OO        Suspected source of infection  urinary tract infection  -OO        Are two or more of the following signs & symptoms of infection both present and new to the patient?  --        Indicate SIRS criteria Hyperthemia > 38 3C (100 9F); Tachycardia > 90 bpm;Tachypnea > 20 resp per min  -OO        If the answer is yes to both questions, suspicion of sepsis is present  --        If severe sepsis is present AND tissue hypoperfusion perists in the hour after fluid resuscitation or lactate > 4, the patient meets criteria for SEPTIC SHOCK  --        Are any of the following organ dysfunction criteria present within 6 hours of suspected infection and SIRS criteria that are NOT considered to be chronic conditions? (!) Yes  -OO        Organ dysfunction  Lactate > 2 0 mmol/L  -OO        Date of presentation of severe sepsis  09/18/19  -OO        Time of presentation of severe sepsis  0045  -OO        Tissue hypoperfusion persists in the hour after crystalloid fluid administration, evidenced, by either:  --        Was hypotension present within one hour of the conclusion of crystalloid fluid administration? No  -OO        Date of presentation of septic shock  --        Time of presentation of septic shock  --          User Key  (r) = Recorded By, (t) = Taken By, (c) = Cosigned By    [address] Name Provider Type    OO Holly Levels, DO Physician               Default Flowsheet Data (last 720 hours)      Sepsis Reassess     Row Name 09/18/19 0710                   Repeat Volume Status and Tissue Perfusion Assessment Performed    Repeat Volume Status and Tissue Perfusion Assessment Performed  Yes  -MM           Volume Status and Tissue Perfusion Post Fluid Resuscitation * Must Document All *    Vital Signs Reviewed (HR, RR, BP, T)  Yes  -MM        Shock Index Reviewed  Yes  -MM        Arterial Oxygen Saturation Reviewed (POx, SaO2 or SpO2)  --        Cardio  Normal S1/S2; Regular rate and rhythm  -MM        Pulmonary  Normal effort;Clear to auscultation  -MM        Capillary Refill  Brisk  -MM        Peripheral Pulses  Radial;Dorsalis Pedis  -MM        Peripheral Pulse  +2  -MM        Dorsalis Pedis  +2  -MM        Skin Warm;Dry;Normal  -MM        Urine output assessed  Adequate  -MM           *OR*   Intensive Monitoring- Must Document One of the Following Four *:    Vital Signs Reviewed  --        * Central Venous Pressure (CVP or RAP)  --        * Central Venous Oxygen (SVO2, ScvO2 or Oxygen saturation via central catheter)  --        * Bedside Cardiovascular US in IVC diameter and % collapse  --        * Passive Leg Raise OR Crystalloid Challenge  --          User Key  (r) = Recorded By, (t) = Taken By, (c) = Cosigned By    Initials Name Provider Type    MM Sapna Stark, Derian Galan Nurse Practitioner

## 2019-09-18 NOTE — H&P
H&P- Tammy Agosto 1955, 60 y o  male MRN: [id number]    Unit/Bed#: ED 10 Encounter: [id number]    Primary Care Provider: No primary care provider on file  Date and time admitted to hospital: 9/18/2019 12:34 AM    * Sepsis due to urinary tract infection [facility])  Assessment & Plan  Patient woke up tonight from sleep today with fevers/chills  Patient had some dysuria yesterday after holding his urine for several hours  Patient chronic low back pain, no change  No nausea, vomiting, abdominal pain  patient reports some difficulty/hesitancy today  Sepsis as evidenced by fever, tachycardia, bandemia, lactic acidosis  Urinalysis significant for leukocytes, nitrites, bacteria and blood  · Admit to medicine  · Continue rocephin  · Received sepsis bolus in ER, continue IV hydration  · Trend lactate until normal  · Send procalcitonin and trend  · Blood and urine cultures pending  · CT of the abdomen and pelvis pending    Hypokalemia  Assessment & Plan  Potassium 3 4, replaced  Repeat in AM    FLIP (acute kidney injury) ([facility] )  Assessment & Plan  Unknown baseline  Continue IV hydration  CT of the A/P pending to rule out obstruction  PVR    Tobacco dependence  Assessment & Plan  Infrequent cigar smoking  Cessation reinforced    Diabetes ([facility] [address] )  Assessment & Plan  Hold metformin  Continue accuchecks AC/HS with insulin sliding scale  Check Hgba1c      VTE Prophylaxis: Enoxaparin (Lovenox)  / sequential compression device   Code Status: No Order    Anticipated Length of Stay:  Patient will be admitted on an Inpatient basis with an anticipated length of stay of greater than 2 midnights  Justification for Hospital Stay: urosepsis     Total Time for Visit, including Counseling / Coordination of Care: 20 minutes  Greater than 50% of this total time spent on direct patient counseling and coordination of care      Chief Complaint:   Fever - 9 weeks to 74 years (Pt states he woke up with shivering, pt states he has no other symptoms )      History of Present Illness:    Traci Mccloud is a 60 y o  male with a PMH of diabetes and glaucoma who presents with fevers/chills  Patient states that he woke up from sleep this evening with fevers and chills  Patient states he went to football game yesterday and was holding his bladder for quite some time  After this he developed some dysuria with urinary difficulty and hesitancy  He has chronic low back pain due to prior injury  He denies any nausea, vomiting, abdominal pain  Patient meets sepsis criteria with fever, tachycardia, bandemia, lactic acidosis  Urinalysis in the emergency department was significant for leukocytes, nitrates, bacteria and blood  CT of the abdomen and pelvis is pending  Patient is admitted for management of urosepsis  Review of Systems:    Review of Systems   Constitutional: Positive for chills  HENT: Negative  Eyes: Negative  Respiratory: Negative  Cardiovascular: Negative  Gastrointestinal: Negative  Endocrine: Negative  Genitourinary: Positive for dysuria, hematuria and urgency  Musculoskeletal: Negative  Skin: Negative  Allergic/Immunologic: Negative  Neurological: Negative  Hematological: Negative  Psychiatric/Behavioral: Negative  Past Medical and Surgical History:     Past Medical History:   Diagnosis Date    Diabetes mellitus ([facility] )     Glaucoma        Past Surgical History:   Procedure Laterality Date    EYE SURGERY         Meds/Allergies:    Prior to Admission medications    Medication Sig Start Date End Date Taking? Authorizing Provider   metFORMIN (GLUCOPHAGE) 500 mg tablet Take 500 mg by mouth 2 (two) times a day with meals   Yes Historical Provider, MD       Allergies:    Allergies   Allergen Reactions    Cat Hair Extract      Reaction Date: 03Dec2008;     Dust Mite Extract      Reaction Date: 03Dec2008;    Soo Appl Pollen Allergen      Reaction Date: 03Dec2008;     Tree Extract      Reaction Date: 03Dec2008; Social History:     Marital Status: Unknown   Substance Use History:   Social History     Substance and Sexual Activity   Alcohol Use Never    Frequency: Never     Social History     Tobacco Use   Smoking Status Current Some Day Smoker   Smokeless Tobacco Never Used   Tobacco Comment    Occ cigar     Social History     Substance and Sexual Activity   Drug Use Never       Family History:    Family History   Problem Relation Age of Onset    Diabetes Mother     Heart attack Mother     No Known Problems Brother     No Known Problems Brother     No Known Problems Brother        Physical Exam:     Vitals:   Blood Pressure: 107/55 (09/18/19 0215)  Pulse: (!) 116 (09/18/19 0215)  Temperature: (!) 102 3 °F (39 1 °C) (09/18/19 0135)  Temp Source: Tympanic (09/18/19 0135)  Respirations: (!) 26 (09/18/19 0215)  Weight - Scale: 93 4 kg (206 lb) (09/18/19 0035)  SpO2: 97 % (09/18/19 0215)    Physical Exam   Constitutional: He is oriented to person, place, and time  He appears well-developed and well-nourished  HENT:   Head: Normocephalic and atraumatic  Eyes: Pupils are equal, round, and reactive to light  EOM are normal    Cardiovascular: Regular rhythm and normal heart sounds  Tachycardia present  Pulmonary/Chest: Effort normal and breath sounds normal  No respiratory distress  Abdominal: Soft  Bowel sounds are normal  He exhibits no distension  There is no CVA tenderness  Musculoskeletal: Normal range of motion  He exhibits no edema  Neurological: He is alert and oriented to person, place, and time  Skin: Skin is warm and dry  Psychiatric: He has a normal mood and affect  His behavior is normal    Nursing note and vitals reviewed  Additional Data:     Lab Results: I have personally reviewed pertinent reports        Results from last 7 days   Lab Units 09/18/19  0044   WBC Thousand/uL 5 26   HEMOGLOBIN g/dL 15 8   HEMATOCRIT % 46 8   PLATELETS Thousands/uL 252     Results from last 7 days   Lab Units 09/18/19  0044   SODIUM mmol/L 139   POTASSIUM mmol/L 3 4*   CHLORIDE mmol/L 100   CO2 mmol/L 27   BUN mg/dL 10   CREATININE mg/dL 1 43*   CALCIUM mg/dL 8 6   TOTAL BILIRUBIN mg/dL 1 40*   ALK PHOS U/L 136*   ALT U/L 38   AST U/L 33     Results from last 7 days   Lab Units 09/18/19  0044   INR  0 98             Results from last 7 days   Lab Units 09/18/19  0044   LACTIC ACID mmol/L 3 7*       Imaging: I have personally reviewed pertinent reports  XR chest 1 view portable   ED Interpretation by John Koehler DO (09/18 0126)   nad      CT renal stone study abdomen pelvis without contrast    (Results Pending)       XR chest 1 view portable   ED Interpretation   nad      CT renal stone study abdomen pelvis without contrast    (Results Pending)       EKG, Pathology, and Other Studies Reviewed on Admission:   · EKG: not applicable    Allscripts / Epic Records Reviewed: Yes     ** Please Note: This note has been constructed using a voice recognition system   **

## 2019-09-18 NOTE — CONSULTS
Patient seen and evaluated on the floor  Daria Gaytan is a 59 yo male with past medical history of diabetes, glaucoma who presented with sudden onset fever, chills  He went to bed yesterday evening feeling well and woke up shaking which prompted him to go to the emergency room for evaluation  He reports a several day history of dysuria  He thinks he was voiding less than normal but was unsure  In the ER, he met sepsis criteria secondary to tachycardia, fever, and bandemia  Laboratory workup revealed lactic acid of 3 7  Otherwise, he had a WBC 5 26, 19% bandemia, creatinine of 1 4 (unknown baseline), anion gap 12, glucose 215  UA showed positive nitrite, small leukocytes, 20-30 WBC, moderate bacteria  UA also showed ketones, elevated glucose  CT scan showed 8mm calculus within the right kidney, no hydronephrosis  CT without contrast so it is insensitive to pylonephritis  Blood cultures, urine culture pending  In the ER, BP was in the 140s  HR 120s  Sp02 stable on room air  Patient given 30mL/kg and ceftriaxone for presumed UTI in the ER and admitted the Hospitalist service  After the fluid bolus was complete, patient's SBP decreased into the 80-90s  Repeat lactic was 3 8  Critical care asked to evaluate the patient secondary to hypotension after fluid resuscitation  Upon exam, patient is alert and oriented x 4, without complaints  He is in no distress, is not ill appearing  He is voiding >1,000mL since admission  He denies nausea, vomiting, abdominal pain  He reports chronic back pain that is unchanged  Repeat labs show lactic acid down to 2 1, anion gap decreased to 8, creatinine down to 1 26  Will give an additional liter of Isolyte and closely monitor blood pressure  Recheck lactic q2 until clearance  Will check lipase  Continue to check blood glucose, most recently 249 on BMP  Sliding scale insulin ordered  Goal BG <180       ROS:   General: normal appetitie  Neuro: no dizziness, lightheadedness  Cardio: no palpitations, no chest pain  Pulmonary: no SOB, wheezing  GI: no abdominal pain, normal bowel movements  : Dysuria x2 days    Physical exam:   General: no acute distress  generally well appearing  Neuro:  AxOx4, PERRLA  CV: RRR, no M/R/G   Pulm: Lungs clear to auscultation, currently on room air  GI: Non distended, nontender, normal bowel sounds  : voiding in urinal   MSK: no edema, skin warm, dry, good capillary refill

## 2019-09-18 NOTE — QUICK NOTE
Patient hypotensive post sepsis bolus despite maintenance fluids, critical care consulted  Case discussed with ICU AP

## 2019-09-18 NOTE — ASSESSMENT & PLAN NOTE
· Lab work from June 12 demonstrates a total cholesterol of 206, triglycerides 221, HDL 37,   · Continue at home medication

## 2019-09-19 PROBLEM — I95.9 HYPOTENSION: Status: RESOLVED | Noted: 2019-09-18 | Resolved: 2019-09-19

## 2019-09-19 PROBLEM — E87.6 HYPOKALEMIA: Status: RESOLVED | Noted: 2019-09-18 | Resolved: 2019-09-19

## 2019-09-19 PROBLEM — B96.20 E COLI BACTEREMIA: Status: ACTIVE | Noted: 2019-09-19

## 2019-09-19 PROBLEM — R78.81 E COLI BACTEREMIA: Status: ACTIVE | Noted: 2019-09-19

## 2019-09-19 PROBLEM — N40.0 ENLARGED PROSTATE: Status: ACTIVE | Noted: 2019-09-19

## 2019-09-19 LAB
ANION GAP SERPL CALCULATED.3IONS-SCNC: 8 MMOL/L (ref 4–13)
BASOPHILS # BLD AUTO: 0.04 THOUSANDS/ΜL (ref 0–0.1)
BASOPHILS NFR BLD AUTO: 0 % (ref 0–1)
BUN SERPL-MCNC: 8 MG/DL (ref 5–25)
CALCIUM SERPL-MCNC: 7.5 MG/DL (ref 8.3–10.1)
CHLORIDE SERPL-SCNC: 107 MMOL/L (ref 100–108)
CO2 SERPL-SCNC: 23 MMOL/L (ref 21–32)
CREAT SERPL-MCNC: 1.12 MG/DL (ref 0.6–1.3)
EOSINOPHIL # BLD AUTO: 0.02 THOUSAND/ΜL (ref 0–0.61)
EOSINOPHIL NFR BLD AUTO: 0 % (ref 0–6)
ERYTHROCYTE [DISTWIDTH] IN BLOOD BY AUTOMATED COUNT: 12.5 % (ref 11.6–15.1)
GFR SERPL CREATININE-BSD FRML MDRD: 81 ML/MIN/1.73SQ M
GLUCOSE SERPL-MCNC: 179 MG/DL (ref 65–140)
GLUCOSE SERPL-MCNC: 184 MG/DL (ref 65–140)
GLUCOSE SERPL-MCNC: 223 MG/DL (ref 65–140)
GLUCOSE SERPL-MCNC: 253 MG/DL (ref 65–140)
HCT VFR BLD AUTO: 36.4 % (ref 36.5–49.3)
HGB BLD-MCNC: 12.4 G/DL (ref 12–17)
IMM GRANULOCYTES # BLD AUTO: 0.06 THOUSAND/UL (ref 0–0.2)
IMM GRANULOCYTES NFR BLD AUTO: 1 % (ref 0–2)
LYMPHOCYTES # BLD AUTO: 1.13 THOUSANDS/ΜL (ref 0.6–4.47)
LYMPHOCYTES NFR BLD AUTO: 12 % (ref 14–44)
MAGNESIUM SERPL-MCNC: 2.1 MG/DL (ref 1.6–2.6)
MCH RBC QN AUTO: 30 PG (ref 26.8–34.3)
MCHC RBC AUTO-ENTMCNC: 34.1 G/DL (ref 31.4–37.4)
MCV RBC AUTO: 88 FL (ref 82–98)
MONOCYTES # BLD AUTO: 0.73 THOUSAND/ΜL (ref 0.17–1.22)
MONOCYTES NFR BLD AUTO: 8 % (ref 4–12)
MRSA NOSE QL CULT: NORMAL
NEUTROPHILS # BLD AUTO: 7.72 THOUSANDS/ΜL (ref 1.85–7.62)
NEUTS SEG NFR BLD AUTO: 79 % (ref 43–75)
NRBC BLD AUTO-RTO: 0 /100 WBCS
PHOSPHATE SERPL-MCNC: 2.9 MG/DL (ref 2.3–4.1)
PLATELET # BLD AUTO: 179 THOUSANDS/UL (ref 149–390)
PMV BLD AUTO: 9.1 FL (ref 8.9–12.7)
POTASSIUM SERPL-SCNC: 3.9 MMOL/L (ref 3.5–5.3)
PROCALCITONIN SERPL-MCNC: 18.88 NG/ML
RBC # BLD AUTO: 4.13 MILLION/UL (ref 3.88–5.62)
SODIUM SERPL-SCNC: 138 MMOL/L (ref 136–145)
WBC # BLD AUTO: 9.7 THOUSAND/UL (ref 4.31–10.16)

## 2019-09-19 PROCEDURE — NC001 PR NO CHARGE: Performed by: STUDENT IN AN ORGANIZED HEALTH CARE EDUCATION/TRAINING PROGRAM

## 2019-09-19 PROCEDURE — 84145 PROCALCITONIN (PCT): CPT | Performed by: NURSE PRACTITIONER

## 2019-09-19 PROCEDURE — 99233 SBSQ HOSP IP/OBS HIGH 50: CPT | Performed by: INTERNAL MEDICINE

## 2019-09-19 PROCEDURE — 80048 BASIC METABOLIC PNL TOTAL CA: CPT | Performed by: NURSE PRACTITIONER

## 2019-09-19 PROCEDURE — 82948 REAGENT STRIP/BLOOD GLUCOSE: CPT

## 2019-09-19 PROCEDURE — 85025 COMPLETE CBC W/AUTO DIFF WBC: CPT | Performed by: NURSE PRACTITIONER

## 2019-09-19 PROCEDURE — 84100 ASSAY OF PHOSPHORUS: CPT | Performed by: NURSE PRACTITIONER

## 2019-09-19 PROCEDURE — 83735 ASSAY OF MAGNESIUM: CPT | Performed by: NURSE PRACTITIONER

## 2019-09-19 RX ORDER — ACETAMINOPHEN 325 MG/1
TABLET ORAL
Status: COMPLETED
Start: 2019-09-19 | End: 2019-09-19

## 2019-09-19 RX ORDER — INSULIN GLARGINE 100 [IU]/ML
10 INJECTION, SOLUTION SUBCUTANEOUS
Status: DISCONTINUED | OUTPATIENT
Start: 2019-09-19 | End: 2019-09-19

## 2019-09-19 RX ORDER — TAMSULOSIN HYDROCHLORIDE 0.4 MG/1
0.4 CAPSULE ORAL
Status: DISCONTINUED | OUTPATIENT
Start: 2019-09-19 | End: 2019-09-21 | Stop reason: HOSPADM

## 2019-09-19 RX ORDER — ACETAMINOPHEN 325 MG/1
650 TABLET ORAL EVERY 6 HOURS PRN
Status: DISCONTINUED | OUTPATIENT
Start: 2019-09-19 | End: 2019-09-21 | Stop reason: HOSPADM

## 2019-09-19 RX ORDER — INSULIN GLARGINE 100 [IU]/ML
10 INJECTION, SOLUTION SUBCUTANEOUS
Status: DISCONTINUED | OUTPATIENT
Start: 2019-09-19 | End: 2019-09-21 | Stop reason: HOSPADM

## 2019-09-19 RX ADMIN — SODIUM CHLORIDE, SODIUM GLUCONATE, SODIUM ACETATE, POTASSIUM CHLORIDE, MAGNESIUM CHLORIDE, SODIUM PHOSPHATE, DIBASIC, AND POTASSIUM PHOSPHATE 125 ML/HR: .53; .5; .37; .037; .03; .012; .00082 INJECTION, SOLUTION INTRAVENOUS at 05:08

## 2019-09-19 RX ADMIN — INSULIN LISPRO 6 UNITS: 100 INJECTION, SOLUTION INTRAVENOUS; SUBCUTANEOUS at 11:53

## 2019-09-19 RX ADMIN — TAMSULOSIN HYDROCHLORIDE 0.4 MG: 0.4 CAPSULE ORAL at 16:49

## 2019-09-19 RX ADMIN — CEFTRIAXONE 2000 MG: 2 INJECTION, SOLUTION INTRAVENOUS at 14:45

## 2019-09-19 RX ADMIN — ENOXAPARIN SODIUM 40 MG: 40 INJECTION SUBCUTANEOUS at 08:43

## 2019-09-19 RX ADMIN — INSULIN LISPRO 4 UNITS: 100 INJECTION, SOLUTION INTRAVENOUS; SUBCUTANEOUS at 08:43

## 2019-09-19 RX ADMIN — INSULIN GLARGINE 10 UNITS: 100 INJECTION, SOLUTION SUBCUTANEOUS at 21:46

## 2019-09-19 RX ADMIN — ACETAMINOPHEN 650 MG: 325 TABLET, FILM COATED ORAL at 01:00

## 2019-09-19 RX ADMIN — INSULIN LISPRO 2 UNITS: 100 INJECTION, SOLUTION INTRAVENOUS; SUBCUTANEOUS at 16:54

## 2019-09-19 RX ADMIN — INSULIN LISPRO 2 UNITS: 100 INJECTION, SOLUTION INTRAVENOUS; SUBCUTANEOUS at 21:46

## 2019-09-19 RX ADMIN — MELATONIN 6 MG: 3 TAB ORAL at 21:46

## 2019-09-19 RX ADMIN — AZELASTINE HYDROCHLORIDE 2 SPRAY: 137 SPRAY, METERED NASAL at 08:42

## 2019-09-19 RX ADMIN — ACETAMINOPHEN 650 MG: 325 TABLET, FILM COATED ORAL at 08:42

## 2019-09-19 RX ADMIN — AZELASTINE HYDROCHLORIDE 2 SPRAY: 137 SPRAY, METERED NASAL at 17:47

## 2019-09-19 RX ADMIN — ACETAMINOPHEN 650 MG: 325 TABLET ORAL at 01:00

## 2019-09-19 RX ADMIN — ACETAMINOPHEN 650 MG: 325 TABLET, FILM COATED ORAL at 00:59

## 2019-09-19 NOTE — UTILIZATION REVIEW
Initial Clinical Review    Admission: Date/Time/Statement: Inpatient Admission Orders (From admission, onward)     Ordered        09/18/19 0223  Inpatient Admission  Once                   Orders Placed This Encounter   Procedures    Inpatient Admission     Standing Status:   Standing     Number of Occurrences:   1     Order Specific Question:   Admitting Physician     Answer:   Kris Silver     Order Specific Question:   Level of Care     Answer:   Med Surg [16]     Order Specific Question:   Estimated length of stay     Answer:   More than 2 Midnights     Order Specific Question:   Certification     Answer:   I certify that inpatient services are medically necessary for this patient for a duration of greater than two midnights  See H&P and MD Progress Notes for additional information about the patient's course of treatment  ED Arrival Information     Expected Arrival Acuity Means of Arrival Escorted By Service Admission Type    - 9/18/2019 00:28 Urgent Walk-In Family Member Pulmonology Urgent    Arrival Complaint    Body Shaking        Chief Complaint   Patient presents with    Fever - 9 weeks to 74 years     Pt states he woke up with shivering, pt states he has no other symptoms  Assessment/Plan:   61y Male to ED presents with fevers/ chills  Pt developed  dysuria, urinary difficulty and hesitancy while holding his bladder for several hours at a ball game  PMH of Chronic low back pain, Tobacco dependence and Diabetes  Admit Inpatient level of care for Sepsis, FLIP (Acute Kidney Injury) and Hypokalemia  Continue Iv Antibiotics, IV Fluids, trend lactate, procalcitonin, blood and urine cultures, replaced K and repeat in AM, unknown creat baseline and PVR    9/18 Critical Care cons; Urosepsis - Septic shock/UTI/ Lactic acidosis/ FLIP - on the floor became hypotensive requiring pressors after 30 cc/kilogram of fluid bolus continued to have persistent hypotension now on Levophed at 2 lactate dropped from 3 7-2 1  Continue IVF @ 125 and Levophed to keep map greater than 65  Transferred to ICU  ED Triage Vitals   Temperature Pulse Respirations Blood Pressure SpO2   09/18/19 0035 09/18/19 0039 09/18/19 0039 09/18/19 0039 09/18/19 0039   (!) 101 7 °F (38 7 °C) (!) 124 18 142/67 99 %      Temp Source Heart Rate Source Patient Position - Orthostatic VS BP Location FiO2 (%)   09/18/19 0035 09/18/19 0039 09/18/19 0039 09/18/19 0039 --   Tympanic Monitor Lying Left arm       Pain Score       09/18/19 0030       No Pain        Wt Readings from Last 1 Encounters:   09/19/19 101 kg (222 lb 0 1 oz)     Additional Vital Signs:   09/18/19 2000  99 3 °F (37 4 °C)  100  24Abnormal   99/58  72  Room air    09/18/19 1930  99 3 °F (37 4 °C)  --  --  --  --  --    09/18/19 1900  --  106  Abnormal   22  115/56  80  --    09/18/19 1800  100 5 °F (38 1 °C)  107  Abnormal   20  122/61  81  --    09/18/19 1700  --  104  22  114/56  80        Pertinent Labs/Diagnostic Test Results:   9/18 PCXR - No acute cardiopulmonary disease  9/18 CT renal stone study Abd/Pelvis - 8mm calculus in the right kidney  No hydronephrosis    Note, CT without contrast is insensitive for the detection of pyelonephritis, correlate with urinalysis      Results from last 7 days   Lab Units 09/18/19 0513 09/18/19  0044   WBC Thousand/uL 12 39*  11 81* 5 26   HEMOGLOBIN g/dL 12 5  12 3 15 8   HEMATOCRIT % 36 0*  36 0* 46 8   PLATELETS Thousands/uL 208  195 252   NEUTROS ABS Thousands/µL 10 93*  --    BANDS PCT %  --  19*         Results from last 7 days   Lab Units 09/18/19  0927 09/18/19 0513 09/18/19  0044   SODIUM mmol/L  --  139 139   POTASSIUM mmol/L  --  3 6 3 4*   CHLORIDE mmol/L  --  108 100   CO2 mmol/L  --  23 27   ANION GAP mmol/L  --  8 12   BUN mg/dL  --  9 10   CREATININE mg/dL  --  1 26 1 43*   EGFR ml/min/1 73sq m  --  70 60   CALCIUM mg/dL  --  7 3* 8 6   CALCIUM, IONIZED mmol/L 1 05*  --   --    MAGNESIUM mg/dL  --  1 6  -- PHOSPHORUS mg/dL  --   --   --      Results from last 7 days   Lab Units 09/18/19  0513 09/18/19  0044   AST U/L 27 33   ALT U/L 32 38   ALK PHOS U/L 91 136*   TOTAL PROTEIN g/dL 5 4* 7 7   ALBUMIN g/dL 3 0* 4 3   TOTAL BILIRUBIN mg/dL 1 00 1 40*     Results from last 7 days   Lab Units 09/18/19  2125 09/18/19  1622 09/18/19  1131 09/18/19  0720   POC GLUCOSE mg/dl 228* 277* 260* 203*     Results from last 7 days   Lab Units 09/18/19  0513 09/18/19  0044   GLUCOSE RANDOM mg/dL 249* 215*         Results from last 7 days   Lab Units 09/18/19  0513   HEMOGLOBIN A1C % 8 3*  8 3*   EAG mg/dl 192  192     Results from last 7 days   Lab Units 09/18/19  1130 09/18/19  0927   TROPONIN I ng/mL <0 02 <0 02         Results from last 7 days   Lab Units 09/18/19  0044   PROTIME seconds 10 5   INR  0 98   PTT seconds 24*     Results from last 7 days   Lab Units 09/18/19  0926   TSH 3RD GENERATON uIU/mL 0 685     Results from last 7 days   Lab Units 09/18/19  0926 09/18/19  0513   PROCALCITONIN ng/ml 20 16* 15 51*     Results from last 7 days   Lab Units 09/18/19  1130 09/18/19  0928 09/18/19  0703 09/18/19  0513 09/18/19  0301 09/18/19  0044   LACTIC ACID mmol/L 1 6 2 1* 2 6* 2 1* 3 8* 3 7*     Results from last 7 days   Lab Units 09/18/19  0926 09/18/19  0513   LIPASE u/L 259 402*     Results from last 7 days   Lab Units 09/18/19  0126   CLARITY UA  Slightly Cloudy   COLOR UA  Yellow   SPEC GRAV UA  1 010   PH UA  5 5   GLUCOSE UA mg/dl 100 (1/10%)*   KETONES UA mg/dl 15 (1+)*   BLOOD UA  Moderate*   PROTEIN UA mg/dl Trace*   NITRITE UA  Positive*   BILIRUBIN UA  Negative   UROBILINOGEN UA E U /dl 1 0   LEUKOCYTES UA  Small*   WBC UA /hpf 20-30*   RBC UA /hpf 2-4*   BACTERIA UA /hpf Moderate*   EPITHELIAL CELLS WET PREP /hpf None Seen     Results from last 7 days   Lab Units 09/18/19  0050   INFLUENZA A PCR  Not Detected   INFLUENZA B PCR  Not Detected   RSV PCR  Not Detected     Results from last 7 days   Lab Units 09/18/19  0126 09/18/19  0112 09/18/19  0044   BLOOD CULTURE   --  Gram Negative Odin Enteric Like* Gram Negative Odin Enteric Like*   GRAM STAIN RESULT   --  Gram negative rods* Gram negative rods*   URINE CULTURE  >100,000 cfu/ml Gram Negative Odin*  --   --      Results from last 7 days   Lab Units 09/18/19  0044   TOTAL COUNTED  100     ED Treatment:   Medication Administration from 09/18/2019 0027 to 09/18/2019 5450       Date/Time Order Dose Route Action     09/18/2019 0049 sodium chloride 0 9 % bolus 1,000 mL 1,000 mL Intravenous New Bag     09/18/2019 0044 acetaminophen (TYLENOL) tablet 650 mg 650 mg Oral Given     09/18/2019 0115 cefTRIAXone (ROCEPHIN) IVPB (premix) 1,000 mg 1,000 mg Intravenous New Bag     09/18/2019 0307 sodium chloride 0 9 % bolus 1,000 mL 1,000 mL Intravenous Continue to Inpatient Floor     09/18/2019 0226 sodium chloride 0 9 % bolus 1,000 mL 1,000 mL Intravenous New Bag     09/18/2019 0308 sodium chloride 0 9 % bolus 1,000 mL 1,000 mL Intravenous Continue to Inpatient Floor     09/18/2019 0207 sodium chloride 0 9 % bolus 1,000 mL 1,000 mL Intravenous New Bag        Past Medical History:   Diagnosis Date    Diabetes mellitus ([facility] )     Glaucoma      Present on Admission:   [facility]   Type 2 diabetes mellitus with hyperglycemia, without long-term current use of insulin (HCC)   FLIP (acute kidney injury) ([facility] [address] )   (Resolved) Hypokalemia   Hyperlipidemia   (Resolved) Hypotension   UTI (urinary tract infection)   Enlarged prostate    Admitting Diagnosis: Fever in adult [R50 9]  Severe sepsis ([facility] [address] ) [A41 9, R65 20]     Age/Sex: 60 y o  male     Admission Orders:  IP CONSULT TO UROLOGY  Bld culture x2  Neurological checks q4h    Current Facility-Administered Medications:  acetaminophen 650 mg Oral Q6H PRN  9/18 x1   azelastine 2 spray Each Nare BID   cefTRIAXone 2,000 mg Intravenous Q24H   cetirizine 10 mg Oral Daily   enoxaparin 40 mg Subcutaneous Daily   insulin lispro 1-5 Units Subcutaneous HS   insulin lispro 2-12 Units Subcutaneous TID AC   melatonin 6 mg Oral HS   ondansetron 4 mg Intravenous Q6H PRN     multi-electrolyte (ISOLYTE-S PH 7 4 equivalent) IV solution   Rate: 125 mL/hr Dose: 125 mL/hr  Freq: Continuous Route: IV  Start: 09/18/19 1145 End:     norepinephrine (LEVOPHED) 4 mg (STANDARD CONCENTRATION) IV in sodium chloride 0 9% 250 mL   Rate: 3 8-112 5 mL/hr Dose: 1-30 mcg/min  Freq: Titrated Route: IV  Start: 09/18/19 0800 End:    sodium chloride 0 9 % infusion   Rate: 125 mL/hr Dose: 125 mL/hr  Freq: Continuous Route: IV  Last Dose: Stopped (09/18/19 1231)  Start: 09/18/19 0345 End: 09/18/19 1141    Network Utilization Review Department  Phone: [phone]; Fax [phone]  [email or web address]  ATTENTION: Please call with any questions or concerns to [phone]  and carefully listen to the prompts so that you are directed to the right person  Send all requests for admission clinical reviews, approved or denied determinations and any other requests to fax [phone]   All voicemails are confidential

## 2019-09-19 NOTE — PROGRESS NOTES
Daily Progress Note - Critical Care/ Stepdown   Mina Talbot 60 y o  male MRN: [id number]  Unit/Bed#: ICU 04 Encounter: [id number]    ______________________________________________________________________  Assessment:     * UTI (urinary tract infection)  Assessment & Plan  · Patient admitted through the ED overnight with complaints of a 1 day history of burning with urination with fevers and chills  · Urine significant for nitrates, bacteria, and blood  · CT of the abdomen and pelvis demonstrated: 8mm calculus in the right kidney  No hydronephrosis  Note, CT without contrast is insensitive for the detection of pyelonephritis, correlate with urinalysis  Patient noted to have enlarged prostate  · Emergency department patient was patient met SIRS criteria with fever, tachycardia, bandemia, and lactic acidosis with suspected infection  · Patient's lactic acid was 3 8, and he received 30 mils per kg volume resuscitation protocol, lactic clear to 1 6  · Continue ceftriaxone IV  · For UTI guidelines given UTI symptoms plus sepsis, hypotension fever and chills likely pyelonephritis  · Cultures pending      Septic shock ([facility] [address] )  Assessment & Plan  · Evidence by fever, tachycardia, tachypnea, Source likely UTI/pyelonephritis  · Overnight patient became hypotensive despite adequate volume resuscitation  · Lactic acid slow to clear  · Cultures pending  · Patient transferred to the ICU and low-dose Levophed infusion administered  · Follow-up cultures  · Procalcitonin pending  · Continue ceftriaxone  · Continue Isolyte at 125 mL/hour    FLIP (acute kidney injury) ([facility] [address] )  Assessment & Plan  · In the setting of urosepsis/septic shock Patient creatinine on admission 1 43, now down to 1 2  · Unsure patient's baseline  · Continue to monitor and trend renal indices  · Avoid nephrotoxin agents  · Patient states he does take NSAIDs from time to time for chronic back pain, but denies excessive use    He states he understands that is no good for her stomach her kidneys  Hypotension-resolved as of 9/19/2019  Assessment & Plan  · Secondary to septic shock  · 9/19, initiated low-dose Levophed 2 mcg titrate for map greater than 65  · Levophed has been weaned off since yesterday afternoon around 1300    Type 2 diabetes mellitus with hyperglycemia, without long-term current use of insulin [facility])  Assessment & Plan  Lab Results   Component Value Date    HGBA1C 8 3 (H) 09/18/2019    HGBA1C 8 3 (H) 09/18/2019       Recent Labs     09/18/19  0720 09/18/19  1131   POCGLU 203* 260*       Blood Sugar Average: Last 72 hrs:  (P) 231 5   · Patient's at-home metformin on hold secondary to lactic acidosis and FLIP  · Continue subcutaneous insulin infusion  · Constant carb diet    Enlarged prostate  Assessment & Plan  · With past medical history of BPH and states he has had UTIs in the past but not frequently  He follows Dr Epi Alonso in the past but has not seen him in about 10 years  Patient had 1 postvoid residual prior to transfer to the ICU of 300 mL  His most recent postvoid residual was 50 mL  · Once acute infection has resolved, patient should follow up for further recommendations and urological workup  Since metabolic syndrome is associated with higher prevalence and severity of BPH, aim for better blood glucose control, and healthy lifestyle  Hyperlipidemia  Assessment & Plan  · Lab work from June 12 demonstrates a total cholesterol of 206, triglycerides 221, HDL 37,   · Continue at home medication  Hypokalemia-resolved as of 9/19/2019  Assessment & Plan  · Replete as necessary        Plan:    Neuro:   · Delirium: CAM ICU positive no   · Pain controlled with:  P r n   Tylenol  · Pain score: mild  · Regulate sleep/wake cycle    CV:   · Cardiac infusions: Levophed, weaned 9/18  · Rhythm: NSR  · Follow rhythm on telemetry    Pulm:   · Encourage coughing and deep breathing  · Pulmonary hygiene    GI:   · Nutrition/diet plan: Continue constant carb controlled diet  · Stress ulcer prophylaxis: No prophylaxis needed  · Bowel regimen: None currently  · Last BM:  Prior to admission    FEN:   · Fluid/Diuretic plan: No intervention  · Goal 24 hour fluid balance:  Euvolemic  · Electrolytes repleted: not applicable  · Goal: K >7 1, Mag >2 0, and Phos >3 0    :   · Strict I&Os  · Continue to monitor postvoid residuals    ID:   · Abx ordered: Ceftriaxone  · Day # 2 of 7 day course  · Trend temps and WBC count    Heme:   · Hemoglobin stable at 12 4  · Trend hgb and plts    Endo:   · Glycemic control plan: Blood glucose not controlled  Increase SQ insulin  Will consider adding long-acting insulin coverage  Msk/Skin:  · Mobility goal:  Maintain skin integrity  · PT consult: no  · OT consult: no  · Frequent turning and off-loading    Family:  · Family updated within 24 hours: yes   · Family meeting planned today: no     VTE Prophylaxis:  · Pharmacologic Prophylaxis: Enoxaparin (Lovenox)  · Mechanical Prophylaxis: sequential compression device    Disposition: Transfer to general medical floor    Code Status: Level 1 - Full Code    Counseling / Coordination of Care  Total time spent today 30 minutes  Greater than 50% of total time was spent with the patient and / or family counseling and / or coordination of care    ______________________________________________________________________    HPI/24hr events:   Patient admitted 9/18 for a UTI and urosepsis  Patient was initially transferred to the floor and became hypotensive despite adequate volume resuscitation  Patient required low-dose Levophed for a few hours  Levophed was titrated off approximately 1300 9/18     ______________________________________________________________________    Physical Exam:   Physical Exam   Constitutional: He is oriented to person, place, and time  He appears well-developed and well-nourished  HENT:   Head: Normocephalic and atraumatic     Eyes: Pupils are equal, round, and reactive to light  Neck: Normal range of motion  Neck supple  Cardiovascular: Normal rate  Pulmonary/Chest: Effort normal    Abdominal: Soft  Musculoskeletal: Normal range of motion  Neurological: He is alert and oriented to person, place, and time  Skin: Skin is warm and dry  Capillary refill takes less than 2 seconds  ______________________________________________________________________  Vitals:    19 0100 19 0200 19 0400 19 0600   BP:  95/53 93/52    BP Location:  Left arm Left arm    Pulse: 88 94 82    Resp: 20 20 20    Temp: (!) 100 6 °F (38 1 °C) 100 4 °F (38 °C) 99 6 °F (37 6 °C)    TempSrc:  Oral Oral    SpO2:  93% 95%    Weight:    101 kg (222 lb 0 1 oz)   Height:                  Temperature:   Temp (24hrs), Av 8 °F (37 7 °C), Min:98 4 °F (36 9 °C), Max:101 6 °F (38 7 °C)    Current Temperature: 99 6 °F (37 6 °C)    Weights:   IBW: 71 85 kg    Body mass index is 32 31 kg/m²  Weight (last 2 days)     Date/Time   Weight    19 0600   101 (222)    19 0300   97 1 (214 07)    19 0035   93 4 (206)              Hemodynamic Monitoring:  N/A       Non-Invasive/Invasive Ventilation Settings:  Respiratory    Lab Data (Last 4 hours)    None         O2/Vent Data (Last 4 hours)    None              No results found for: PHART, VHZ3GVL, PO2ART, EGO0OOQ, C0HLPWQN, BEART, SOURCE  SpO2: SpO2: 95 %    Intake and Outputs:  I/O       701 -  07 07 -  07 07 -  0700    P  O  120      I V  (mL/kg) 1314 6 (13 5) 681 3 (6 7)     IV Piggyback 3050 50     Total Intake(mL/kg) 4484 6 (46 2) 731 3 (7 2)     Urine (mL/kg/hr) 1000 6065 (2 5)     Total Output 1000 6065     Net +3484 6 -5333 8            Unmeasured Urine Occurrence 2 x            Nutrition:        Diet Orders   (From admission, onward)             Start     Ordered    19 0859  Room Service  Once     Question:  Type of Service  Answer:  Room Service-Appropriate    09/18/19 0858    09/18/19 0857  Diet Tomas/CHO Controlled; Consistent Carbohydrate Diet Level 2 (5 carb servings/75 grams CHO/meal)  Diet effective now     Question Answer Comment   Diet Type Tomas/CHO Controlled    Tomas/CHO Controlled Consistent Carbohydrate Diet Level 2 (5 carb servings/75 grams CHO/meal)    RD to adjust diet per protocol? Yes        09/18/19 0856                Labs:   Results from last 7 days   Lab Units 09/19/19  0456 09/18/19  0513 09/18/19  0044   WBC Thousand/uL 9 70 12 39*  11 81* 5 26   HEMOGLOBIN g/dL 12 4 12 5  12 3 15 8   HEMATOCRIT % 36 4* 36 0*  36 0* 46 8   PLATELETS Thousands/uL 179 208  195 252   NEUTROS PCT % 79* 89*  --    MONOS PCT % 8 5  --    MONO PCT %  --   --  0*     Results from last 7 days   Lab Units 09/19/19  0456 09/18/19  0513 09/18/19  0044   SODIUM mmol/L 138 139 139   POTASSIUM mmol/L 3 9 3 6 3 4*   CHLORIDE mmol/L 107 108 100   CO2 mmol/L 23 23 27   BUN mg/dL 8 9 10   CREATININE mg/dL 1 12 1 26 1 43*   CALCIUM mg/dL 7 5* 7 3* 8 6   ALK PHOS U/L  --  91 136*   ALT U/L  --  32 38   AST U/L  --  27 33     Results from last 7 days   Lab Units 09/19/19  0456 09/18/19  0513   MAGNESIUM mg/dL 2 1 1 6     Lab Results   Component Value Date    PHOS 2 9 09/19/2019      Results from last 7 days   Lab Units 09/18/19  0044   INR  0 98   PTT seconds 24*     0   Lab Value Date/Time    TROPONINI <0 02 09/18/2019 1130    TROPONINI <0 02 09/18/2019 0927     Results from last 7 days   Lab Units 09/18/19  1130 09/18/19  0928 09/18/19  0703   LACTIC ACID mmol/L 1 6 2 1* 2 6*     ABG:No results found for: PHART, SUO1BWD, PO2ART, KUW8JQI, V8UDPOLI, BEART, SOURCE    Imaging:   CT renal stone study abdomen pelvis without contrast   Final Result      8mm calculus in the right kidney  No hydronephrosis  Note, CT without contrast is insensitive for the detection of pyelonephritis, correlate with urinalysis              Workstation performed: [id number]         XR chest 1 view portable   ED Interpretation   nad      Final Result      No acute cardiopulmonary disease  Workstation performed: [id number]            I have personally reviewed pertinent reports  EKG:  Normal sinus rhythm on monitor    Micro:  No results found for: ANIKA Dan    Allergies: Allergies   Allergen Reactions    Cat Hair Extract      Reaction Date: 03Dec2008;     Dust Mite Extract      Reaction Date: 03Dec2008;    [place] [facility] Pollen Allergen      Reaction Date: 03Dec2008;     Tree Extract      Reaction Date: 03Dec2008; Medications:   Scheduled Meds:  Current Facility-Administered Medications:  acetaminophen 650 mg Oral Q6H PRN Margarie Concepción, GERALDNP    azelastine 2 spray Each Nare BID EthBRIGID Sanchez    cefTRIAXone 2,000 mg Intravenous Q24H BRIGID German Last Rate: Stopped (09/18/19 1437)   cetirizine 10 mg Oral Daily BRIGID Cody    enoxaparin 40 mg Subcutaneous Daily Radha BRIGID Rivera    insulin lispro 1-5 Units Subcutaneous HS BRIGID Cody    insulin lispro 2-12 Units Subcutaneous TID AC BRIGID Cody    melatonin 6 mg Oral HS Sharee Beebe, GERALDNP    multi-electrolyte 125 mL/hr Intravenous Continuous BRIGID German Last Rate: 125 mL/hr (09/19/19 0508)   ondansetron 4 mg Intravenous Q6H PRN BRIGID Sigala      Continuous Infusions:  multi-electrolyte 125 mL/hr Last Rate: 125 mL/hr (09/19/19 0508)     PRN Meds:    acetaminophen 650 mg Q6H PRN   ondansetron 4 mg Q6H PRN       Invasive lines and devices:   Invasive Devices     Peripheral Intravenous Line            Peripheral IV 09/18/19 Right Antecubital 1 day    Peripheral IV 09/18/19 Right Hand 1 day                   SIGNATURE: BRIGID German  DATE: September 19, 2019

## 2019-09-19 NOTE — CONSULTS
H&P Exam - Urology       Patient: Rubin Garland   : 1955 Sex: male   MRN: [id number]     CSN: [id number]      History of Present Illness   HPI:  Rubin Garland is a 60 y o  male who presented to [address] [place] [address] ER late last night after waking up with shaking chills dysuria suprapubic discomfort lower back pain found to have urinary tract infection consistent on workup to have urosepsis CT scan of the abdomen pelvis confirms 8 mm nonobstructing renal stone patient seen at the bedside at this time eating lunch stating he feels better he has had longstanding prostate issues over the last 5-10 years now noting a slow stream going 6 to 8 times a day getting up 2-3 times a night with hesitancy and intermittency he denies gross hematuria he denies family history of prostate cancer he has a history of kidney stones with last attack some 10-15 years ago        Review of Systems:   Constitutional:  Negative for activity change, fever, chills and diaphoresis  HENT: Negative for hearing loss and trouble swallowing  Eyes: Negative for itching and visual disturbance  Respiratory: Negative for chest tightness and shortness of breath  Cardiovascular: Negative for chest pain, edema  Gastrointestinal: Negative for abdominal distention, na abdominal pain, constipation, diarrhea, Nausea and vomiting  Genitourinary: Negative for decreased urine volume, difficulty urinating, dysuria, enuresis, frequency, hematuria and urgency  Musculoskeletal: Negative for gait problem and myalgias  Neurological: Negative for dizziness and headaches  Hematological: Does not bruise/bleed easily         Historical Information   Past Medical History:   Diagnosis Date    Diabetes mellitus ([facility] [address] )     Glaucoma      Past Surgical History:   Procedure Laterality Date    EYE SURGERY       Social History   Social History     Substance and Sexual Activity   Alcohol Use Never    Frequency: Never     Social History     Substance and Sexual Activity   Drug Use Never     Social History     Tobacco Use   Smoking Status Current Some Day Smoker   Smokeless Tobacco Never Used   Tobacco Comment    rarely cigar     Family History:   Family History   Problem Relation Age of Onset    Diabetes Mother     Heart attack Mother     No Known Problems Brother     No Known Problems Brother     No Known Problems Brother        Meds/Allergies   Medications Prior to Admission   Medication    metFORMIN (GLUCOPHAGE) 500 mg tablet     Allergies   Allergen Reactions    Cat Hair Extract      Reaction Date: 03Dec2008;     Dust Mite Extract      Reaction Date: 03Dec2008;    Euna Sciara Pollen Allergen      Reaction Date: 03Dec2008;     Tree Extract      Reaction Date: 03Dec2008; Objective   Vitals: /70 (BP Location: Left arm)   Pulse 82   Temp 98 2 °F (36 8 °C) (Temporal)   Resp (!) 28   Ht 5' 9 5" (1 765 m)   Wt 101 kg (222 lb 0 1 oz)   SpO2 95%   BMI 32 31 kg/m²     Physical Exam:  General Alert awake   Normocephalic atraumatic PERRLA  Lungs clear bilaterally  Cardiac normal S1 normal S2  Abdomen soft, flank pain none  Abdomen soft  Two normal testicles  Right inguinal hernia  Rectal exam confirms move enlarged nontender prostate  Extremities no edema    I/O last 24 hours:   In: 731 3 [I V :681 3; IV Piggyback:50]  Out: 7181 [Urine:7181]    Invasive Devices     Peripheral Intravenous Line            Peripheral IV 09/18/19 Right Antecubital 1 day    Peripheral IV 09/18/19 Right Hand 1 day                    Lab Results: CBC:   Lab Results   Component Value Date    WBC 9 70 09/19/2019    HGB 12 4 09/19/2019    HCT 36 4 (L) 09/19/2019    MCV 88 09/19/2019     09/19/2019    MCH 30 0 09/19/2019    MCHC 34 1 09/19/2019    RDW 12 5 09/19/2019    MPV 9 1 09/19/2019    NRBC 0 09/19/2019     CMP:   Lab Results   Component Value Date     09/19/2019    CO2 23 09/19/2019    BUN 8 09/19/2019    CREATININE 1 12 09/19/2019    CALCIUM 7 5 (L) 09/19/2019    AST 27 09/18/2019    ALT 32 09/18/2019    ALKPHOS 91 09/18/2019    EGFR 81 09/19/2019     Urinalysis:   Lab Results   Component Value Date    COLORU Yellow 09/18/2019    CLARITYU Slightly Cloudy 09/18/2019    SPECGRAV 1 010 09/18/2019    PHUR 5 5 09/18/2019    LEUKOCYTESUR Small (A) 09/18/2019    NITRITE Positive (A) 09/18/2019    GLUCOSEU 100 (1/10%) (A) 09/18/2019    KETONESU 15 (1+) (A) 09/18/2019    BILIRUBINUR Negative 09/18/2019    BLOODU Moderate (A) 09/18/2019     Urine Culture:   Lab Results   Component Value Date    URINECX >100,000 cfu/ml Gram Negative Odin (A) 09/18/2019     PSA: No results found for: PSA    CT scan 8 mm right lower pole renal stone    Assessment/ Plan:  Urosepsis  BPH voiding issues  8 mm right renal stone  Plan start tamsulosin 0 4 mg  Follow-up postvoid residuals  Patient to be seen in the office follow-up in a few weeks once clears infection  For evaluation of voiding complaints does wish to consider outpatient shockwave lithotripsy for his 8 mm stone in fear of attack will discuss in office      Eder Ring MD

## 2019-09-19 NOTE — ASSESSMENT & PLAN NOTE
· With past medical history of BPH and states he has had UTIs in the past but not frequently  He follows Dr Jeannie Tan in the past but has not seen him in about 10 years  Patient had 1 postvoid residual prior to transfer to the ICU of 300 mL  His most recent postvoid residual was 50 mL  · Once acute infection has resolved, patient should follow up for further recommendations and urological workup  Since metabolic syndrome is associated with higher prevalence and severity of BPH, aim for better blood glucose control, and healthy lifestyle

## 2019-09-19 NOTE — PROGRESS NOTES
Transfer Note - ICU/Stepdown Transfer to [facility]/MS michael Mendoza 60 y o  male MRN: [id number]  [facility] [address]   Unit/Bed#: ICU 04 Encounter: [id number]    Code Status: Level 1 - Full Code    Reason for ICU/Stepdown admission: Urosepsis, hypotension requiring pressor support     Active problems:     * UTI (urinary tract infection)  Assessment & Plan  · Patient admitted through the ED overnight with complaints of a 1 day history of burning with urination with fevers and chills  · Urine significant for nitrates, bacteria, and blood  · CT of the abdomen and pelvis demonstrated: 8mm calculus in the right kidney  No hydronephrosis  Note, CT without contrast is insensitive for the detection of pyelonephritis, correlate with urinalysis  Patient noted to have enlarged prostate  · Emergency department patient was patient met SIRS criteria with fever, tachycardia, bandemia, and lactic acidosis with suspected infection  · Patient's lactic acid was 3 8, and he received 30 mils per kg volume resuscitation protocol, lactic clear to 1 6  · Continue ceftriaxone IV      E coli bacteremia  Assessment & Plan  · 9/18 blood cultures x2 positive for E coli in addition to urine cultures  · ID consulted     Septic shock (HCC)  Assessment & Plan  · Evidence by fever, tachycardia, tachypnea, Source likely UTI/pyelonephritis  · Overnight patient became hypotensive despite adequate volume resuscitation    · Lactic acid slow to clear  · Cultures pending  · Patient transferred to the ICU and low-dose Levophed infusion administered, able to be titrated off   · Follow-up cultures: urine and blood cultures with E coli  · Procalcitonin elevated up to 20 1, trending down today 18 88  · Continue ceftriaxone    Type 2 diabetes mellitus with hyperglycemia, without long-term current use of insulin [facility])  Assessment & Plan  Lab Results   Component Value Date    HGBA1C 8 3 (H) 09/18/2019    HGBA1C 8 3 (H) 09/18/2019 Recent Labs     09/18/19  1622 09/18/19  2125 09/19/19  1132 09/19/19  1614   POCGLU 277* 228* 253* 184*       Blood Sugar Average: Last 72 hrs:  (P) 545 [id number]   · Patient's at-home metformin on hold secondary to lactic acidosis and FLIP  · Continue ISS + accuchecks   · 9/19 initiating long acting insulin with Lantus 10 units HS  · Constant carb diet    Enlarged prostate  Assessment & Plan  · With past medical history of BPH and states he has had UTIs in the past but not frequently  He follows Dr Faina Moses in the past but has not seen him in about 10 years  Patient had 1 postvoid residual prior to transfer to the ICU of 300 mL  His most recent postvoid residual was 50 mL  · Once acute infection has resolved, patient should follow up for further recommendations and urological workup  Since metabolic syndrome is associated with higher prevalence and severity of BPH, aim for better blood glucose control, and healthy lifestyle  Hyperlipidemia  Assessment & Plan  · Lab work from June 12 demonstrates a total cholesterol of 206, triglycerides 221, HDL 37,   · Continue at home medication  FLIP (acute kidney injury) ([facility] [address] )  Assessment & Plan  · In the setting of urosepsis/septic shock Patient creatinine on admission 1 43, now down to 1 2, 1 1  · Continue to monitor and trend renal indices  · Avoid nephrotoxin agents  · Patient states he does take NSAIDs from time to time for chronic back pain, but denies excessive use  He states he understands that is no good for her stomach her kidneys  Hypotension-resolved as of 9/19/2019  Assessment & Plan  · Secondary to septic shock  · 9/19, initiated low-dose Levophed 2 mcg titrate for map greater than 65     · Levophed has been weaned off since yesterday afternoon around 1300    Hypokalemia-resolved as of 9/19/2019  Assessment & Plan  · Replete as necessary      Consultants:   · Urology  · Infectious Disease     History of Present Illness/Summary of clinical course:   Admitted and treated for urosepsis, in ED lactic acid elevated to 3 8  Due to hypotension persisting despite appropriate IVF resuscitation, was transferred to ICU for pressor support  He was initially on levophed and able to be titrated down  Clinically improved  Had urinary retention and was evaluated by urology, started on flomax  Found on imaging to have 8mm renal stone with urology plan to follow up outpatient for possible shockwave lithotripsy  Please refer to today's progress note for further clinical details  Recent or scheduled procedures: none    Outstanding/pending diagnostics: final susceptibilities of cultures       Mobilization Plan: out of bed as tolerated, PT/OT    Nutrition Plan: diabetic diet    Discharge Plan: Patient should be ready for discharge home after further clinical improvement      Specific Diagnosis Plan:    Sepsis: Source (cultures): urine and blood x2 positive for E coli, Antibiotics: Ceftriaxone, ID: consult placed  Heart Failure:  NA      Home medications that are not reordered and reason why: Metformin 500mg BID due to FLIP on admission    Spoke with Dr Catalino Hennessy regarding transfer @ 3:30PM  Patient accepted to their service      Pardeep Méndez DO

## 2019-09-19 NOTE — UTILIZATION REVIEW
Continued Stay Review    Date: 9/19                     Current Patient Class: Inpatient Current Level of Care: Med Surg    HPI:64 y o  male initially admitted on 9/18    Assessment/Plan: 9/19 Progress notes; Ongoing fevers despite on IV antibiotics and IV Fluids, f/u cultures  Levophed weaned off as hypotension has resolved    Continue tele monitoring and strict I&Os    Pertinent Labs/Diagnostic Results:   Results from last 7 days   Lab Units 09/19/19 0456 09/18/19  0513 09/18/19  0044   WBC Thousand/uL 9 70 12 39*  11 81* 5 26   HEMOGLOBIN g/dL 12 4 12 5  12 3 15 8   HEMATOCRIT % 36 4* 36 0*  36 0* 46 8   PLATELETS Thousands/uL 179 208  195 252   NEUTROS ABS Thousands/µL 7 72* 10 93*  --    BANDS PCT %  --   --  19*         Results from last 7 days   Lab Units 09/19/19 0456 09/18/19 0927 09/18/19 0513 09/18/19  0044   SODIUM mmol/L 138  --  139 139   POTASSIUM mmol/L 3 9  --  3 6 3 4*   CHLORIDE mmol/L 107  --  108 100   CO2 mmol/L 23  --  23 27   ANION GAP mmol/L 8  --  8 12   BUN mg/dL 8  --  9 10   CREATININE mg/dL 1 12  --  1 26 1 43*   EGFR ml/min/1 73sq m 81  --  70 60   CALCIUM mg/dL 7 5*  --  7 3* 8 6   CALCIUM, IONIZED mmol/L  --  1 05*  --   --    MAGNESIUM mg/dL 2 1  --  1 6  --    PHOSPHORUS mg/dL 2 9  --   --   --      Results from last 7 days   Lab Units 09/18/19  0513 09/18/19  0044   AST U/L 27 33   ALT U/L 32 38   ALK PHOS U/L 91 136*   TOTAL PROTEIN g/dL 5 4* 7 7   ALBUMIN g/dL 3 0* 4 3   TOTAL BILIRUBIN mg/dL 1 00 1 40*     Results from last 7 days   Lab Units 09/18/19 2125 09/18/19  1622 09/18/19  1131 09/18/19  0720   POC GLUCOSE mg/dl 228* 277* 260* 203*     Results from last 7 days   Lab Units 09/19/19 0456 09/18/19  0513 09/18/19  0044   GLUCOSE RANDOM mg/dL 179* 249* 215*         Results from last 7 days   Lab Units 09/18/19  0513   HEMOGLOBIN A1C % 8 3*  8 3*   EAG mg/dl 192  192     Results from last 7 days   Lab Units 09/18/19  1130 09/18/19  0927   TROPONIN I ng/mL <0 02 <0 02         Results from last 7 days   Lab Units 09/18/19  0044   PROTIME seconds 10 5   INR  0 98   PTT seconds 24*     Results from last 7 days   Lab Units 09/18/19  0926   TSH 3RD GENERATON uIU/mL 0 685     Results from last 7 days   Lab Units 09/19/19  0456 09/18/19  0926 09/18/19  0513   PROCALCITONIN ng/ml 18 88* 20 16* 15 51*     Results from last 7 days   Lab Units 09/18/19  1130 09/18/19  0928 09/18/19  0703 09/18/19  0513 09/18/19  0301   LACTIC ACID mmol/L 1 6 2 1* 2 6* 2 1* 3 8*     Results from last 7 days   Lab Units 09/18/19  0926 09/18/19  0513   LIPASE u/L 259 402*     Results from last 7 days   Lab Units 09/18/19  0126   CLARITY UA  Slightly Cloudy   COLOR UA  Yellow   SPEC GRAV UA  1 010   PH UA  5 5   GLUCOSE UA mg/dl 100 (1/10%)*   KETONES UA mg/dl 15 (1+)*   BLOOD UA  Moderate*   PROTEIN UA mg/dl Trace*   NITRITE UA  Positive*   BILIRUBIN UA  Negative   UROBILINOGEN UA E U /dl 1 0   LEUKOCYTES UA  Small*   WBC UA /hpf 20-30*   RBC UA /hpf 2-4*   BACTERIA UA /hpf Moderate*   EPITHELIAL CELLS WET PREP /hpf None Seen     Results from last 7 days   Lab Units 09/18/19  0050   INFLUENZA A PCR  Not Detected   INFLUENZA B PCR  Not Detected   RSV PCR  Not Detected       Results from last 7 days   Lab Units 09/18/19  0126 09/18/19  0112 09/18/19  0044   BLOOD CULTURE   --  Gram Negative [place] Enteric Like* Gram Negative Odin Enteric Like*   GRAM STAIN RESULT   --  Gram negative rods* Gram negative rods*   URINE CULTURE  >100,000 cfu/ml Gram Negative Odin*  --   --      Results from last 7 days   Lab Units 09/18/19  0044   TOTAL COUNTED  100     Vital Signs:   09/19/19 0400  99 6 °F (37 6 °C)  82  20  93/52  --  95 %  None (Room air)  Lying   09/19/19 0200  100 4 °F (38 °C)  94  20  95/53  --  93 %  --  Lying   09/19/19 0100  100 6 °F (38 1 °C)Abnormal   88  20  --  --  --  --  --   09/19/19 0000  101 6 °F (38 7 °C)Abnormal   87  22  103/53  76  --  --       Medications:   Scheduled Meds:   Current Facility-Administered Medications:  acetaminophen 650 mg Oral Q6H PRN 9/19 x2   azelastine 2 spray Each Nare BID    Ceftriaxone 2,000 mg Intravenous Q24H    cetirizine 10 mg Oral Daily    enoxaparin 40 mg Subcutaneous Daily    insulin lispro 1-5 Units Subcutaneous HS    insulin lispro 2-12 Units Subcutaneous TID AC    melatonin 6 mg Oral HS    ondansetron 4 mg Intravenous Q6H PRN      multi-electrolyte (ISOLYTE-S PH 7 4 equivalent) IV solution   Rate: 125 mL/hr Dose: 125 mL/hr  Freq: Continuous Route: IV  Last Dose: 125 mL/hr (09/19/19 0508)  Start: 09/18/19 1145 End: 09/19/19 0957    Discharge Plan: TBD    Network Utilization Review Department  Phone: [phone]; Fax [phone]  [email or web address]  ATTENTION: Please call with any questions or concerns to [phone]  and carefully listen to the prompts so that you are directed to the right person  Send all requests for admission clinical reviews, approved or denied determinations and any other requests to fax [phone]   All voicemails are confidential

## 2019-09-19 NOTE — PLAN OF CARE
Problem: METABOLIC, FLUID AND ELECTROLYTES - ADULT  Goal: Electrolytes maintained within normal limits  Description  INTERVENTIONS:  - Monitor labs and assess patient for signs and symptoms of electrolyte imbalances  - Administer electrolyte replacement as ordered  - Monitor response to electrolyte replacements, including repeat lab results as appropriate  - Instruct patient on fluid and nutrition as appropriate  Outcome: Progressing  Goal: Fluid balance maintained  Description  INTERVENTIONS:  - Monitor labs   - Monitor I/O and WT  - Instruct patient on fluid and nutrition as appropriate  - Assess for signs & symptoms of volume excess or deficit  Outcome: Progressing  Goal: Glucose maintained within target range  Description  INTERVENTIONS:  - Monitor Blood Glucose as ordered  - Assess for signs and symptoms of hyperglycemia and hypoglycemia  - Administer ordered medications to maintain glucose within target range  - Assess nutritional intake and initiate nutrition service referral as needed  Outcome: Progressing     Problem: PAIN - ADULT  Goal: Verbalizes/displays adequate comfort level or baseline comfort level  Description  Interventions:  - Encourage patient to monitor pain and request assistance  - Assess pain using appropriate pain scale  - Administer analgesics based on type and severity of pain and evaluate response  - Implement non-pharmacological measures as appropriate and evaluate response  - Consider cultural and social influences on pain and pain management  - Notify physician/advanced practitioner if interventions unsuccessful or patient reports new pain  Outcome: Progressing     Problem: INFECTION - ADULT  Goal: Absence or prevention of progression during hospitalization  Description  INTERVENTIONS:  - Assess and monitor for signs and symptoms of infection  - Monitor lab/diagnostic results  - Monitor all insertion sites, i e  indwelling lines, tubes, and drains  - [place] appropriate cooling/warming therapies per order  - Administer medications as ordered  - Instruct and encourage patient and family to use good hand hygiene technique  - Identify and instruct in appropriate isolation precautions for identified infection/condition   Outcome: Progressing     Problem: SAFETY ADULT  Goal: Patient will remain free of falls  Description  INTERVENTIONS:  - Assess patient frequently for physical needs  -  Identify cognitive and physical deficits and behaviors that affect risk of falls    -  [place] fall precautions as indicated by assessment   - Educate patient/family on patient safety including physical limitations  - Instruct patient to call for assistance with activity based on assessment  - Modify environment to reduce risk of injury  - Consider OT/PT consult to assist with strengthening/mobility  Outcome: Progressing  Goal: Maintain or return to baseline ADL function  Description  INTERVENTIONS:  -  Assess patient's ability to carry out ADLs; assess patient's baseline for ADL function and identify physical deficits which impact ability to perform ADLs (bathing, care of mouth/teeth, toileting, grooming, dressing, etc )  - Assess/evaluate cause of self-care deficits   - Assess range of motion  - Assess patient's mobility; develop plan if impaired  - Assess patient's need for assistive devices and provide as appropriate  - Encourage maximum independence but intervene and supervise when necessary  - Involve family in performance of ADLs  - Assess for home care needs following discharge   - Consider OT consult to assist with ADL evaluation and planning for discharge  - Provide patient education as appropriate  Outcome: Progressing  Goal: Maintain or return mobility status to optimal level  Description  INTERVENTIONS:  - Assess patient's baseline mobility status (ambulation, transfers, stairs, etc )    - Identify cognitive and physical deficits and behaviors that affect mobility  - Identify mobility aids required to assist with transfers and/or ambulation (gait belt, sit-to-stand, lift, walker, cane, etc )  - [place] fall precautions as indicated by assessment  - Record patient progress and toleration of activity level on Mobility SBAR; progress patient to next Phase/Stage  - Instruct patient to call for assistance with activity based on assessment  - Consider rehabilitation consult to assist with strengthening/weightbearing, etc   Outcome: Progressing     Problem: DISCHARGE PLANNING  Goal: Discharge to home or other facility with appropriate resources  Description  INTERVENTIONS:  - Identify barriers to discharge w/patient and caregiver  - Arrange for needed discharge resources and transportation as appropriate  - Identify discharge learning needs (meds, wound care, etc )  - Arrange for interpretive services to assist at discharge as needed  - Refer to Case Management Department for coordinating discharge planning if the patient needs post-hospital services based on physician/advanced practitioner order or complex needs related to functional status, cognitive ability, or social support system  Outcome: Progressing     Problem: Knowledge Deficit  Goal: Patient/family/caregiver demonstrates understanding of disease process, treatment plan, medications, and discharge instructions  Description  Complete learning assessment and assess knowledge base  Interventions:  - Provide teaching at level of understanding  - Provide teaching via preferred learning methods  Outcome: Progressing     Problem: Potential for Falls  Goal: Patient will remain free of falls  Description  INTERVENTIONS:  - Assess patient frequently for physical needs  -  Identify cognitive and physical deficits and behaviors that affect risk of falls    -  [place] fall precautions as indicated by assessment   - Educate patient/family on patient safety including physical limitations  - Instruct patient to call for assistance with activity based on assessment  - Modify environment to reduce risk of injury  - Consider OT/PT consult to assist with strengthening/mobility  Outcome: Progressing

## 2019-09-20 ENCOUNTER — APPOINTMENT (INPATIENT)
Dept: RADIOLOGY | Facility: HOSPITAL | Age: 64
DRG: 576 | End: 2019-09-20
Payer: COMMERCIAL

## 2019-09-20 LAB
BACTERIA BLD CULT: ABNORMAL
BACTERIA UR CULT: ABNORMAL
GLUCOSE SERPL-MCNC: 204 MG/DL (ref 65–140)
GLUCOSE SERPL-MCNC: 207 MG/DL (ref 65–140)
GLUCOSE SERPL-MCNC: 214 MG/DL (ref 65–140)
GLUCOSE SERPL-MCNC: 314 MG/DL (ref 65–140)
GLUCOSE SERPL-MCNC: 315 MG/DL (ref 65–140)
GRAM STN SPEC: ABNORMAL

## 2019-09-20 PROCEDURE — 99254 IP/OBS CNSLTJ NEW/EST MOD 60: CPT | Performed by: INTERNAL MEDICINE

## 2019-09-20 PROCEDURE — 99232 SBSQ HOSP IP/OBS MODERATE 35: CPT | Performed by: INTERNAL MEDICINE

## 2019-09-20 PROCEDURE — 74018 RADEX ABDOMEN 1 VIEW: CPT

## 2019-09-20 PROCEDURE — 82948 REAGENT STRIP/BLOOD GLUCOSE: CPT

## 2019-09-20 RX ORDER — TRAMADOL HYDROCHLORIDE 50 MG/1
50 TABLET ORAL ONCE AS NEEDED
Status: COMPLETED | OUTPATIENT
Start: 2019-09-20 | End: 2019-09-20

## 2019-09-20 RX ORDER — CEPHALEXIN 500 MG/1
500 CAPSULE ORAL EVERY 6 HOURS SCHEDULED
Status: DISCONTINUED | OUTPATIENT
Start: 2019-09-20 | End: 2019-09-21 | Stop reason: HOSPADM

## 2019-09-20 RX ADMIN — TAMSULOSIN HYDROCHLORIDE 0.4 MG: 0.4 CAPSULE ORAL at 15:58

## 2019-09-20 RX ADMIN — AZELASTINE HYDROCHLORIDE 2 SPRAY: 137 SPRAY, METERED NASAL at 17:14

## 2019-09-20 RX ADMIN — CEPHALEXIN 500 MG: 500 CAPSULE ORAL at 12:53

## 2019-09-20 RX ADMIN — CEPHALEXIN 500 MG: 500 CAPSULE ORAL at 17:14

## 2019-09-20 RX ADMIN — TRAMADOL HYDROCHLORIDE 50 MG: 50 TABLET, FILM COATED ORAL at 05:13

## 2019-09-20 RX ADMIN — INSULIN LISPRO 8 UNITS: 100 INJECTION, SOLUTION INTRAVENOUS; SUBCUTANEOUS at 12:54

## 2019-09-20 RX ADMIN — ENOXAPARIN SODIUM 40 MG: 40 INJECTION SUBCUTANEOUS at 08:38

## 2019-09-20 RX ADMIN — INSULIN LISPRO 4 UNITS: 100 INJECTION, SOLUTION INTRAVENOUS; SUBCUTANEOUS at 08:36

## 2019-09-20 RX ADMIN — INSULIN LISPRO 4 UNITS: 100 INJECTION, SOLUTION INTRAVENOUS; SUBCUTANEOUS at 15:59

## 2019-09-20 RX ADMIN — ACETAMINOPHEN 650 MG: 325 TABLET, FILM COATED ORAL at 01:36

## 2019-09-20 RX ADMIN — INSULIN LISPRO 3 UNITS: 100 INJECTION, SOLUTION INTRAVENOUS; SUBCUTANEOUS at 21:06

## 2019-09-20 RX ADMIN — MELATONIN 6 MG: 3 TAB ORAL at 21:10

## 2019-09-20 RX ADMIN — AZELASTINE HYDROCHLORIDE 2 SPRAY: 137 SPRAY, METERED NASAL at 09:06

## 2019-09-20 NOTE — PLAN OF CARE
Problem: METABOLIC, FLUID AND ELECTROLYTES - ADULT  Goal: Electrolytes maintained within normal limits  Description  INTERVENTIONS:  - Monitor labs and assess patient for signs and symptoms of electrolyte imbalances  - Administer electrolyte replacement as ordered  - Monitor response to electrolyte replacements, including repeat lab results as appropriate  - Instruct patient on fluid and nutrition as appropriate  Outcome: Progressing  Goal: Fluid balance maintained  Description  INTERVENTIONS:  - Monitor labs   - Monitor I/O and WT  - Instruct patient on fluid and nutrition as appropriate  - Assess for signs & symptoms of volume excess or deficit  Outcome: Progressing  Goal: Glucose maintained within target range  Description  INTERVENTIONS:  - Monitor Blood Glucose as ordered  - Assess for signs and symptoms of hyperglycemia and hypoglycemia  - Administer ordered medications to maintain glucose within target range  - Assess nutritional intake and initiate nutrition service referral as needed  Outcome: Progressing     Problem: PAIN - ADULT  Goal: Verbalizes/displays adequate comfort level or baseline comfort level  Description  Interventions:  - Encourage patient to monitor pain and request assistance  - Assess pain using appropriate pain scale  - Administer analgesics based on type and severity of pain and evaluate response  - Implement non-pharmacological measures as appropriate and evaluate response  - Consider cultural and social influences on pain and pain management  - Notify physician/advanced practitioner if interventions unsuccessful or patient reports new pain  Outcome: Progressing     Problem: INFECTION - ADULT  Goal: Absence or prevention of progression during hospitalization  Description  INTERVENTIONS:  - Assess and monitor for signs and symptoms of infection  - Monitor lab/diagnostic results  - Monitor all insertion sites, i e  indwelling lines, tubes, and drains  - [place] appropriate cooling/warming therapies per order  - Administer medications as ordered  - Instruct and encourage patient and family to use good hand hygiene technique  - Identify and instruct in appropriate isolation precautions for identified infection/condition   Outcome: Progressing     Problem: SAFETY ADULT  Goal: Patient will remain free of falls  Description  INTERVENTIONS:  - Assess patient frequently for physical needs  -  Identify cognitive and physical deficits and behaviors that affect risk of falls    -  [place] fall precautions as indicated by assessment   - Educate patient/family on patient safety including physical limitations  - Instruct patient to call for assistance with activity based on assessment  - Modify environment to reduce risk of injury  - Consider OT/PT consult to assist with strengthening/mobility  Outcome: Progressing  Goal: Maintain or return to baseline ADL function  Description  INTERVENTIONS:  -  Assess patient's ability to carry out ADLs; assess patient's baseline for ADL function and identify physical deficits which impact ability to perform ADLs (bathing, care of mouth/teeth, toileting, grooming, dressing, etc )  - Assess/evaluate cause of self-care deficits   - Assess range of motion  - Assess patient's mobility; develop plan if impaired  - Assess patient's need for assistive devices and provide as appropriate  - Encourage maximum independence but intervene and supervise when necessary  - Involve family in performance of ADLs  - Assess for home care needs following discharge   - Consider OT consult to assist with ADL evaluation and planning for discharge  - Provide patient education as appropriate  Outcome: Progressing  Goal: Maintain or return mobility status to optimal level  Description  INTERVENTIONS:  - Assess patient's baseline mobility status (ambulation, transfers, stairs, etc )    - Identify cognitive and physical deficits and behaviors that affect mobility  - Identify mobility aids required to assist with transfers and/or ambulation (gait belt, sit-to-stand, lift, walker, cane, etc )  - [place] fall precautions as indicated by assessment  - Record patient progress and toleration of activity level on Mobility SBAR; progress patient to next Phase/Stage  - Instruct patient to call for assistance with activity based on assessment  - Consider rehabilitation consult to assist with strengthening/weightbearing, etc   Outcome: Progressing     Problem: DISCHARGE PLANNING  Goal: Discharge to home or other facility with appropriate resources  Description  INTERVENTIONS:  - Identify barriers to discharge w/patient and caregiver  - Arrange for needed discharge resources and transportation as appropriate  - Identify discharge learning needs (meds, wound care, etc )  - Arrange for interpretive services to assist at discharge as needed  - Refer to Case Management Department for coordinating discharge planning if the patient needs post-hospital services based on physician/advanced practitioner order or complex needs related to functional status, cognitive ability, or social support system  Outcome: Progressing     Problem: Knowledge Deficit  Goal: Patient/family/caregiver demonstrates understanding of disease process, treatment plan, medications, and discharge instructions  Description  Complete learning assessment and assess knowledge base  Interventions:  - Provide teaching at level of understanding  - Provide teaching via preferred learning methods  Outcome: Progressing     Problem: Potential for Falls  Goal: Patient will remain free of falls  Description  INTERVENTIONS:  - Assess patient frequently for physical needs  -  Identify cognitive and physical deficits and behaviors that affect risk of falls    -  [place] fall precautions as indicated by assessment   - Educate patient/family on patient safety including physical limitations  - Instruct patient to call for assistance with activity based on assessment  - Modify environment to reduce risk of injury  - Consider OT/PT consult to assist with strengthening/mobility  Outcome: Progressing

## 2019-09-20 NOTE — ASSESSMENT & PLAN NOTE
· As evidenced by fever, tachycardia, elevated lactic acid and hypotension upon presentation  · Patient had elevated procalcitonin level up to 20 which is trending down  · Patient needed pressor support briefly in the ICU  · Patient's urine and blood cultures grew E coli  · Secondary to E coli bacteremia  · Patient is on IV Rocephin 2 gram daily and will be transitioned to Keflex 100 milligram p o  Q 6 hours to finish a 10 day course  · Id consult appreciated  · CT scan of the abdomen showed 8 millimeter calculus in the right kidney without any hydronephrosis  · Urology consult appreciated  · Patient will need outpatient follow-up with lithotripsy and possible stent placement once infection has cleared up

## 2019-09-20 NOTE — ASSESSMENT & PLAN NOTE
Metformin is on hold  Hemoglobin A1c was 8 3  Patient is on long-acting insulin Lantus 10 units daily with Humalog sliding scale  Patient can be discharged back on metformin

## 2019-09-20 NOTE — PROGRESS NOTES
Progress Note - Farzaneh Hobbs 1955, 60 y o  male MRN: [id number]    Unit/Bed#: [address] [place] [address] Encounter: [id number]    Primary Care Provider: No primary care provider on file  Date and time admitted to hospital: 9/18/2019 12:34 AM        Septic shock ([facility] [address] )  Assessment & Plan  · As evidenced by fever, tachycardia, elevated lactic acid and hypotension upon presentation  · Patient had elevated procalcitonin level up to 20 which is trending down  · Patient needed pressor support briefly in the ICU  · Patient's urine and blood cultures grew E coli  · Secondary to E coli bacteremia  · Patient is on IV Rocephin 2 gram daily and will be transitioned to Keflex 100 milligram p o  Q 6 hours to finish a 10 day course  · Id consult appreciated  · CT scan of the abdomen showed 8 millimeter calculus in the right kidney without any hydronephrosis  · Urology consult appreciated  · Patient will need outpatient follow-up with lithotripsy and possible stent placement once infection has cleared up  FLIP (acute kidney injury) ([facility] [address] )  Assessment & Plan  Resolved with IV hydration    Type 2 diabetes mellitus with hyperglycemia, without long-term current use of insulin (HCC)  Assessment & Plan  Metformin is on hold  Hemoglobin A1c was 8 3  Patient is on long-acting insulin Lantus 10 units daily with Humalog sliding scale  Patient can be discharged back on metformin        Hyperlipidemia  Assessment & Plan  Outpatient follow up with PCP    Enlarged prostate  Assessment & Plan  Follow up outpatient with Urology          VTE Pharmacologic Prophylaxis:   Pharmacologic: Enoxaparin (Lovenox)  Mechanical VTE Prophylaxis in Place: Yes    Patient Centered Rounds: I have performed bedside rounds with nursing staff today  Discussions with Specialists or Other Care Team Provider: [facility]  Education and Discussions with Family / Patient:Yes  Time Spent for Care: 30 minutes    More than 50% of total time spent on counseling and coordination of care as described above  Current Length of Stay: 2 day(s)  Current Patient Status: Inpatient     Discharge Plan:  Home    Code Status: Level 1 - Full Code      Subjective:   Patient is feeling well  Patient denies any chest pain, abdominal pain, nausea, vomiting  Patient has been in      Objective:     Vitals:   Temp (24hrs), Av 3 °F (36 8 °C), Min:98 °F (36 7 °C), Max:98 9 °F (37 2 °C)    Temp:  [98 °F (36 7 °C)-98 9 °F (37 2 °C)] 98 °F (36 7 °C)  HR:  [72-79] 79  Resp:  [18-20] 18  BP: (109-130)/(53-70) 109/67  SpO2:  [96 %-99 %] 99 %  Body mass index is 32 31 kg/m²  Input and Output Summary (last 24 hours): Intake/Output Summary (Last 24 hours) at 2019 1727  Last data filed at 2019 1200  Gross per 24 hour   Intake 240 ml   Output 1650 ml   Net -1410 ml        Physical Exam:     Physical Exam   Constitutional: No distress  HENT:   Head: Normocephalic and atraumatic  Eyes: Pupils are equal, round, and reactive to light  Conjunctivae are normal    Neck: Normal range of motion  Neck supple  Cardiovascular: Normal rate, regular rhythm and normal heart sounds  Pulmonary/Chest: Effort normal  No respiratory distress  He has no wheezes  He has no rhonchi  He has no rales  He exhibits no tenderness  Abdominal: Soft  Bowel sounds are normal  He exhibits no distension  There is no tenderness  There is no rebound and no guarding  Musculoskeletal: He exhibits no edema  Neurological: He is alert  No cranial nerve deficit  Skin: Skin is warm and dry  No rash noted         Additional Data:     Labs:    Results from last 7 days   Lab Units 19  0513 19  0044   WBC Thousand/uL 9 70 12 39*  11 81* 5 26   HEMOGLOBIN g/dL 12 4 12 5  12 3 15 8   HEMATOCRIT % 36 4* 36 0*  36 0* 46 8   PLATELETS Thousands/uL 179 208  195 252   NEUTROS PCT % 79* 89*  --      Results from last 7 days   Lab Units 196 19  0513 19  0044   SODIUM mmol/L 138 139 139   POTASSIUM mmol/L 3 9 3 6 3 4*   CHLORIDE mmol/L 107 108 100   CO2 mmol/L 23 23 27   BUN mg/dL 8 9 10   CREATININE mg/dL 1 12 1 26 1 43*   CALCIUM mg/dL 7 5* 7 3* 8 6   TOTAL BILIRUBIN mg/dL  --  1 00 1 40*   ALK PHOS U/L  --  91 136*   ALT U/L  --  32 38   AST U/L  --  27 33     Results from last 7 days   Lab Units 09/18/19  0044   INR  0 98     Results from last 7 days   Lab Units 09/18/19  1130 09/18/19  0927   TROPONIN I ng/mL <0 02 <0 02     Lab Results   Component Value Date/Time    HGBA1C 8 3 (H) 09/18/2019 05:13 AM    HGBA1C 8 3 (H) 09/18/2019 05:13 AM     Results from last 7 days   Lab Units 09/20/19  1555 09/20/19  1126 09/20/19  0658 09/20/19  0436 09/19/19  2129 09/19/19  1614 09/19/19  1132 09/18/19  2125 09/18/19  1622 09/18/19  1131 09/18/19  0720   POC GLUCOSE mg/dl 214* 314* 207* 204* 223* 184* 253* 228* 277* 260* 203*     Results from last 7 days   Lab Units 09/19/19  0456 09/18/19  1130 09/18/19  0928 09/18/19  0926 09/18/19  0703 09/18/19  0513   LACTIC ACID mmol/L  --  1 6 2 1*  --  2 6* 2 1*   PROCALCITONIN ng/ml 18 88*  --   --  20 16*  --  15 51*       * I Have Reviewed All Lab Data Listed Above  * Additional Pertinent Lab Tests Reviewed: [place] [facility] Admission Reviewed    Imaging:     XR abdomen 1 view kub   Final Result by Merna Hernandez DO (09/20 1100)   Stable right renal calcifications  Workstation performed: [id number]         CT renal stone study abdomen pelvis without contrast   Final Result by Cory Blue MD (09/18 7588)      8mm calculus in the right kidney  No hydronephrosis  Note, CT without contrast is insensitive for the detection of pyelonephritis, correlate with urinalysis  Workstation performed: [id number]         XR chest 1 view portable   ED Interpretation by Chelly Dorantes DO (09/18 7909)   nad      Final Result by Charu Cedeno MD (09/18 3186)      No acute cardiopulmonary disease  Workstation performed: [id number]           Imaging Reports Reviewed by myself    Cultures:   Blood Culture:   Lab Results   Component Value Date    BLOODCX Escherichia coli (A) 09/18/2019    BLOODCX Escherichia coli (A) 09/18/2019     Urine Culture:   Lab Results   Component Value Date    URINECX >100,000 cfu/ml Escherichia coli (A) 09/18/2019     Sputum Culture: No components found for: SPUTUMCX  Wound Culture: No results found for: WOUNDCULT    Last 24 Hours Medication List:     Current Facility-Administered Medications:  acetaminophen 650 mg Oral Q6H PRN Radha R BRIGID Schroeder   azelastine 2 spray Each Nare BID Radha BRIGID Rivera   cephalexin 500 mg Oral Q6H Shoaib Baird MD   cetirizine 10 mg Oral Daily Radha BRIGID Rivera   enoxaparin 40 mg Subcutaneous Daily Radha BRIGID Rivera   insulin glargine 10 Units Subcutaneous HS Eliza Nur MD   insulin lispro 1-5 Units Subcutaneous HS Radha BRIGID Rivera   insulin lispro 2-12 Units Subcutaneous TID AC Radha BRIGID Rivera   melatonin 6 mg Oral HS Radha BRIGID Rivera   ondansetron 4 mg Intravenous Q6H PRN Radha BRIGID Rivera   tamsulosin 0 4 mg Oral Daily With Dinner BRIGID German        Today, Patient Was Seen By: Madiha Kam MD    ** Please Note: Dragon 360 Dictation voice to text software may have been used in the creation of this document   **

## 2019-09-20 NOTE — PROGRESS NOTES
Progress Note - urology      Patient: August Britton   : 1955 Sex: male   MRN: [id number]     CSN: [id number]  Unit/Bed#: [address] [place] [address]     SUBJECTIVE:   Feeling better  On tamsulosin 0 4 mg  No significant change at      Objective   Vitals: /57 (BP Location: Left arm)   Pulse 74   Temp 98 2 °F (36 8 °C) (Oral)   Resp 18   Ht 5' 9 5" (1 765 m)   Wt 101 kg (222 lb 0 1 oz)   SpO2 97%   BMI 32 31 kg/m²     I/O last 24 hours:   In: -   Out: 7477 [Urine:3616]      Physical Exam:   General Alert awake   Normocephalic atraumatic PERRLA  Lungs clear bilaterally  Cardiac normal S1 normal S2  Abdomen soft, flank pain  Extremities no edema      Lab Results: CBC:   Lab Results   Component Value Date    WBC 9 70 2019    HGB 12 4 2019    HCT 36 4 (L) 2019    MCV 88 2019     2019    MCH 30 0 2019    MCHC 34 1 2019    RDW 12 5 2019    MPV 9 1 2019    NRBC 0 2019     CMP:   Lab Results   Component Value Date     2019    CO2 23 2019    BUN 8 2019    CREATININE 1 12 2019    CALCIUM 7 5 (L) 2019    AST 27 2019    ALT 32 2019    ALKPHOS 91 2019    EGFR 81 2019     Urinalysis:   Lab Results   Component Value Date    COLORU Yellow 2019    CLARITYU Slightly Cloudy 2019    SPECGRAV 1 010 2019    PHUR 5 5 2019    LEUKOCYTESUR Small (A) 2019    NITRITE Positive (A) 2019    GLUCOSEU 100 (1/10%) (A) 2019    KETONESU 15 (1+) (A) 2019    BILIRUBINUR Negative 2019    BLOODU Moderate (A) 2019     Urine Culture:   Lab Results   Component Value Date    URINECX >100,000 cfu/ml Escherichia coli (A) 2019     PSA: No results found for: PSA      Assessment/ Plan:  Urosepsis  Right renal stone  Continue tamsulosin 0 4 mg  KUB does confirm stone  Will consider right shockwave litho after discharge and over infection            April Yang MD

## 2019-09-20 NOTE — UTILIZATION REVIEW
Continued Stay Review    Date: 9/20/19 Friday                   Current Patient Class: INPATIENT    Current Level of Care:  MED SURG    HPI:   60year old male with PMH of Chronic low back pain, Tobacco dependence and DM  Initially presented to [facility] ED 9/18/19 2nd fevers/ chills  After developing dysuria, urinary difficulty and hesitancy while holding his bladder for several hours at a ball game  Admitted Inpatient 9/18/19 AT 0223   2nd Sepsis, FLIP (Acute Kidney Injury) and Hypokalemia  TX IV Antibiotics, IV Fluids, trend lactate, procalcitonin, blood and urine cultures, replaced K and repeat in AM, unknown creat baseline and PVR  9/18 Critical Care cons; Urosepsis - Septic shock/UTI/ Lactic acidosis/ FLIP - on the floor became hypotensive requiring pressors after 30 cc/kilogram of fluid bolus continued to have persistent hypotension now on Levophed at 2 lactate dropped from 3 7-2 1  Continue IVF @ 125 and Levophed to keep map greater than 65  Transferred to ICU       CURRENT ASSESSMENT/ PLAN PER TODAY'S I+D  NOTE:  1  Septic shock  POA:  Fever, tachycardia, refractory hypotension  Secondary to # 2/3  No other appreciable source  Clinically improving with antibiotics, IV fluids  Clinically stable and nontoxic  Rec:  ? Continue antibiotics as below  ? Follow temperatures closely  ? Recheck CBC in a m   ? Supportive care as per the primary service      2   E coli bacteremia  Due to # 3  CT negative for obstruction  Rec:  ? Change antibiotics to Keflex to continue through 9/26 for 10 days total of antibiotics     3   E coli UTI  Likely due to # 4 +/-5  Rec:  ? Continue antibiotics as above      4  BPH  Newly diagnosed  With symptoms of urinary hesitancy and frequency  Rec:  ? Flomax per urology with outpatient follow-up     5  Nonobstructive nephrolithiasis  Seen on CT without hydronephrosis  Rec:  ? Outpatient urology follow-up     6  FLIP  Likely due to above    Slowly improving  Rec:  ? Follow creatinine closely and dose-adjust antibiotics as indicated  ? Recheck BMP in AM     7  Poorly controlled DM  A1c 8 3  Risk factor for infection    Rec:  ? Continue management as per the primary service     The above plan was discussed in detail with the Dr Neeta Kramer and the patient      Antibiotics:  Ceftriaxone # 4    Pertinent Labs/Diagnostic Results:   Results from last 7 days   Lab Units 09/19/19  0456 09/18/19  0513 09/18/19  0044   WBC Thousand/uL 9 70 12 39*  11 81* 5 26   HEMOGLOBIN g/dL 12 4 12 5  12 3 15 8   HEMATOCRIT % 36 4* 36 0*  36 0* 46 8   PLATELETS Thousands/uL 179 208  195 252   NEUTROS ABS Thousands/µL 7 72* 10 93*  --    BANDS PCT %  --   --  19*     Results from last 7 days   Lab Units 09/19/19 0456 09/18/19 0927 09/18/19 0513 09/18/19  0044   SODIUM mmol/L 138  --  139 139   POTASSIUM mmol/L 3 9  --  3 6 3 4*   CHLORIDE mmol/L 107  --  108 100   CO2 mmol/L 23  --  23 27   ANION GAP mmol/L 8  --  8 12   BUN mg/dL 8  --  9 10   CREATININE mg/dL 1 12  --  1 26 1 43*   EGFR ml/min/1 73sq m 81  --  70 60   CALCIUM mg/dL 7 5*  --  7 3* 8 6   CALCIUM, IONIZED mmol/L  --  1 05*  --   --    MAGNESIUM mg/dL 2 1  --  1 6  --    PHOSPHORUS mg/dL 2 9  --   --   --      Results from last 7 days   Lab Units 09/18/19 0513 09/18/19  0044   AST U/L 27 33   ALT U/L 32 38   ALK PHOS U/L 91 136*   TOTAL PROTEIN g/dL 5 4* 7 7   ALBUMIN g/dL 3 0* 4 3   TOTAL BILIRUBIN mg/dL 1 00 1 40*     Results from last 7 days   Lab Units 09/18/19  0513   HEMOGLOBIN A1C % 8 3*  8 3*   EAG mg/dl 192  192     Results from last 7 days   Lab Units 09/18/19  1130 09/18/19 0927   TROPONIN I ng/mL <0 02 <0 02     Results from last 7 days   Lab Units 09/19/19  0456 09/18/19  0926 09/18/19  0513   PROCALCITONIN ng/ml 18 88* 20 16* 15 51*     Results from last 7 days   Lab Units 09/18/19  1130 09/18/19  0928 09/18/19  0703 09/18/19  0513 09/18/19  0301   LACTIC ACID mmol/L 1 6 2 1* 2 6* 2 1* 3 8* Results from last 7 days   Lab Units 19  0926 19  0513   LIPASE u/L 259 402*     Results from last 7 days   Lab Units 19  0126   CLARITY UA  Slightly Cloudy   COLOR UA  Yellow   SPEC GRAV UA  1 010   PH UA  5 5   GLUCOSE UA mg/dl 100 (1/10%)*   KETONES UA mg/dl 15 (1+)*   BLOOD UA  Moderate*   PROTEIN UA mg/dl Trace*   NITRITE UA  Positive*   BILIRUBIN UA  Negative   UROBILINOGEN UA E U /dl 1 0   LEUKOCYTES UA  Small*   WBC UA /hpf 20-30*   RBC UA /hpf 2-4*   BACTERIA UA /hpf Moderate*   EPITHELIAL CELLS WET PREP /hpf None Seen     Results from last 7 days   Lab Units 19  0126 19  0112 19  0044   BLOOD CULTURE   --  Escherichia coli* Escherichia coli*   GRAM STAIN RESULT   --  Gram negative rods* Gram negative rods*   URINE CULTURE  >100,000 cfu/ml Escherichia coli*  --   --      CT ABDOMEN AND PELVIS WITHOUT IV CONTRAST -  8mm calculus in the right kidney   No hydronephrosis   Note, CT without contrast is insensitive for the detection of pyelonephritis, correlate with urinalysis    Vital Signs:   Temp:  [98 °F (36 7 °C)-98 9 °F (37 2 °C)] 98 2 °F (36 8 °C)  HR:  [72-75] 74  Resp:  [18-20] 18  BP: (110-130)/(53-70) 110/57  SpO2:  [96 %-98 %] 97 %  Temp (24hrs), Av 3 °F (36 8 °C), Min:98 °F (36 7 °C), Max:98 9 °F (37 2 °C)  Current: Temperature: 98 2 °F (36 8 °C)    I/O     0701   0700  0701   0700  07 0700   P  O    240   I V  (mL/kg) 681 3 (6 7)     IV Piggyback 50     Total Intake(mL/kg) 731 3 (7 2)  240 (2 4)   Urine (mL/kg/hr) 6065 (2 5) 3416 (1 4) 200 (0 2)   Total Output 6065 3416 200   Net -5333 8 -3416 +40         Unmeasured Urine Occurrence  1 x      Diet Tomas/CHO Controlled; Consistent Carbohydrate Diet Level 2 (5 carb servings/75 grams CHO/meal)    Scheduled Meds:   Current Facility-Administered Medications:    IV cefTRIAXone (ROCEPHIN) IVPB (premix) 2,000 mg    Ordered Dose: 2,000 mg Route: Intravenous Frequency: Every 24 hours @ 100 mL/hr over 30 Minutes   Scheduled Start Date/Time: 09/18/19 1300 End Date/Time: 09/20/19        acetaminophen 650 mg Oral Q6H PRN  GIVEN X 1/ 24 HRS   azelastine 2 spray Each Nare BID   cephalexin 500 mg Oral Q6H [address]   cetirizine 10 mg Oral Daily   enoxaparin 40 mg Subcutaneous Daily   insulin glargine 10 Units Subcutaneous HS   insulin lispro 1-5 Units Subcutaneous HS   insulin lispro 2-12 Units Subcutaneous TID AC   melatonin 6 mg Oral HS   ondansetron 4 mg Intravenous Q6H PRN   tamsulosin 0 4 mg Oral Daily With Dinner     traMADol (ULTRAM) tablet 50 mg    Ordered Dose: 50 mg Route: Oral Frequency: Once as needed for moderate pain, severe pain   GIVEN X 1/ 24 HRS   Scheduled Start Date/Time: 09/20/19 0450 End Date/Time: 09/20/19 0513 after 1 doses       Discharge Plan:   TO BE DETERMINED  CASE MANAGEMENT FOLLOWING Radha Ware [address] Utilization Review Department  Phone: [phone]; Fax [phone]  [email or web address]  ATTENTION: Please call with any questions or concerns to [phone]  and carefully listen to the prompts so that you are directed to the right person  Send all requests for admission clinical reviews, approved or denied determinations and any other requests to fax [phone]   All voicemails are confidential

## 2019-09-20 NOTE — CONSULTS
Consultation - Infectious Disease   Kim Sarmiento 60 y o  male MRN: [id number]  Unit/Bed#: [address] [place] [address] Encounter: [id number]      IMPRESSION & RECOMMENDATIONS:   Impression/Recommendations:  1  Septic shock  POA:  Fever, tachycardia, refractory hypotension  Secondary to # 2/3  No other appreciable source  Clinically improving with antibiotics, IV fluids  Clinically stable and nontoxic  Rec:  · Continue antibiotics as below  · Follow temperatures closely  · Recheck CBC in a m  · Supportive care as per the primary service  2   E coli bacteremia  Due to # 3  CT negative for obstruction  Rec:  · Change antibiotics to Keflex to continue through 9/26 for 10 days total of antibiotics    3   E coli UTI  Likely due to # 4 +/-5  Rec:  · Continue antibiotics as above  4  BPH  Newly diagnosed  With symptoms of urinary hesitancy and frequency  Rec:  · Flomax per urology with outpatient follow-up    5  Nonobstructive nephrolithiasis  Seen on CT without hydronephrosis  Rec:  · Outpatient urology follow-up    6  FLIP  Likely due to above  Slowly improving  Rec:  · Follow creatinine closely and dose-adjust antibiotics as indicated  · Recheck BMP in AM    7  Poorly controlled DM  A1c 8 3  Risk factor for infection  Rec:  · Continue management as per the primary service    The above plan was discussed in detail with the Dr Harini Bedolla and the patient  Antibiotics:  Ceftriaxone # 4    Thank you for this consultation  We will follow along with you  HISTORY OF PRESENT ILLNESS:  Reason for Consult:  Bacteremia    HPI: Kim Sarmiento is a 60 y o  male with a history of diabetes who presented to the emergency department on 09/18 complaining of rigors  Upon presentation he was noted to be febrile with tachycardia and tachypnea as well as hypotension  His labs revealed a lactic acidosis and FLIP  A chest x-ray showed no pneumonia    A non contrasted CT of the abdomen showed an 8 mm calculus in the right kidney without hydronephrosis  He was started on ceftriaxone  The patient remained hypotensive despite IV fluids and was admitted to the ICU and started on pressors  Blood and urine cultures from admission are now growing E coli  He was seen by Urology and felt to have symptoms consistent with BPH and was started on Flomax  Since admission his fevers have improved and he has been transferred out of the ICU  We are asked to comment on further evaluation and management  In performing this consult, I have reviewed prior admission and outpatient visit records in detail  REVIEW OF SYSTEMS:  Denies fevers, chills, sweats, nausea, vomiting, or diarrhea  Constipated  Chronic urinary hesitancy, frequency  No pain  A complete system-based review of systems is otherwise negative  PAST MEDICAL HISTORY:  Past Medical History:   Diagnosis Date    Diabetes mellitus ([facility] [address] )     Glaucoma      Past Surgical History:   Procedure Laterality Date    EYE SURGERY         FAMILY HISTORY:  Non-contributory    SOCIAL HISTORY:  Social History     Substance and Sexual Activity   Alcohol Use Never    Frequency: Never     Social History     Substance and Sexual Activity   Drug Use Never     Social History     Tobacco Use   Smoking Status Current Some Day Smoker   Smokeless Tobacco Never Used   Tobacco Comment    rarely cigar       ALLERGIES:  Allergies   Allergen Reactions    Cat Hair Extract      Reaction Date: 2008;     Dust Mite Extract      Reaction Date: 2008;    Antonio Freed Pollen Allergen      Reaction Date: 2008;     Tree Extract      Reaction Date: 2008;        MEDICATIONS:  All current active medications have been reviewed      PHYSICAL EXAM:  Vitals:  Temp:  [98 °F (36 7 °C)-98 9 °F (37 2 °C)] 98 2 °F (36 8 °C)  HR:  [72-75] 74  Resp:  [18-20] 18  BP: (110-130)/(53-70) 110/57  SpO2:  [96 %-98 %] 97 %  Temp (24hrs), Av 3 °F (36 8 °C), Min:98 °F (36 7 °C), Max:98 9 °F (37 2 °C)  Current: Temperature: 98 2 °F (36 8 °C)     Physical Exam:  General:  Well-nourished, well-developed, in no acute distress  Eyes:  Conjunctive clear with no hemorrhages or effusions  Oropharynx:  No ulcers, no lesions  Neck:  Supple, no lymphadenopathy  Lungs:  Clear to auscultation bilaterally, no accessory muscle use  Cardiac:  Regular rate and rhythm, no murmurs  Abdomen:  Soft, non-tender, non-distended  Extremities:  No peripheral cyanosis, clubbing, or edema  Skin:  No rashes, no ulcers  Neurological:  Moves all four extremities spontaneously, sensation grossly intact  :  No CVA tenderness    LABS, IMAGING, & OTHER STUDIES:  Lab Results:  I have personally reviewed pertinent labs  Results from last 7 days   Lab Units 09/19/19  0456 09/18/19  0513 09/18/19  0044   POTASSIUM mmol/L 3 9 3 6 3 4*   CHLORIDE mmol/L 107 108 100   CO2 mmol/L 23 23 27   BUN mg/dL 8 9 10   CREATININE mg/dL 1 12 1 26 1 43*   EGFR ml/min/1 73sq m 81 70 60   CALCIUM mg/dL 7 5* 7 3* 8 6   AST U/L  --  27 33   ALT U/L  --  32 38   ALK PHOS U/L  --  91 136*     Results from last 7 days   Lab Units 09/19/19  0456 09/18/19  0513 09/18/19  0044   WBC Thousand/uL 9 70 12 39*  11 81* 5 26   HEMOGLOBIN g/dL 12 4 12 5  12 3 15 8   PLATELETS Thousands/uL 179 208  195 252     Results from last 7 days   Lab Units 09/18/19  0406 09/18/19  0126 09/18/19  0112 09/18/19  0050 09/18/19  0044   BLOOD CULTURE   --   --  Escherichia coli*  --  Escherichia coli*   GRAM STAIN RESULT   --   --  Gram negative rods*  --  Gram negative rods*   URINE CULTURE   --  >100,000 cfu/ml Escherichia coli*  --   --   --    MRSA CULTURE ONLY  No Methicillin Resistant Staphlyococcus aureus (MRSA) isolated  --   --   --   --    INFLUENZA B PCR   --   --   --  Not Detected  --    RSV PCR   --   --   --  Not Detected  --        Imaging Studies:   I have personally reviewed pertinent imaging study reports and images in PACS    CTA/P reviewed personally 8 mm calculus in the right kidney without hydronephrosis  EKG, Pathology, and Other Studies:   I have personally reviewed pertinent reports

## 2019-09-21 VITALS
OXYGEN SATURATION: 98 % | SYSTOLIC BLOOD PRESSURE: 115 MMHG | DIASTOLIC BLOOD PRESSURE: 68 MMHG | RESPIRATION RATE: 18 BRPM | HEIGHT: 70 IN | BODY MASS INDEX: 29.19 KG/M2 | WEIGHT: 203.9 LBS | TEMPERATURE: 98.5 F | HEART RATE: 85 BPM

## 2019-09-21 LAB
ANION GAP SERPL CALCULATED.3IONS-SCNC: 10 MMOL/L (ref 4–13)
BACTERIA BLD CULT: ABNORMAL
BASOPHILS # BLD AUTO: 0.03 THOUSANDS/ΜL (ref 0–0.1)
BASOPHILS NFR BLD AUTO: 1 % (ref 0–1)
BUN SERPL-MCNC: 7 MG/DL (ref 5–25)
CALCIUM SERPL-MCNC: 8.4 MG/DL (ref 8.3–10.1)
CHLORIDE SERPL-SCNC: 101 MMOL/L (ref 100–108)
CO2 SERPL-SCNC: 23 MMOL/L (ref 21–32)
CREAT SERPL-MCNC: 0.94 MG/DL (ref 0.6–1.3)
EOSINOPHIL # BLD AUTO: 0.12 THOUSAND/ΜL (ref 0–0.61)
EOSINOPHIL NFR BLD AUTO: 3 % (ref 0–6)
ERYTHROCYTE [DISTWIDTH] IN BLOOD BY AUTOMATED COUNT: 12 % (ref 11.6–15.1)
GFR SERPL CREATININE-BSD FRML MDRD: 100 ML/MIN/1.73SQ M
GLUCOSE SERPL-MCNC: 251 MG/DL (ref 65–140)
GLUCOSE SERPL-MCNC: 262 MG/DL (ref 65–140)
GLUCOSE SERPL-MCNC: 291 MG/DL (ref 65–140)
GRAM STN SPEC: ABNORMAL
HCT VFR BLD AUTO: 41.3 % (ref 36.5–49.3)
HGB BLD-MCNC: 14.3 G/DL (ref 12–17)
IMM GRANULOCYTES # BLD AUTO: 0.02 THOUSAND/UL (ref 0–0.2)
IMM GRANULOCYTES NFR BLD AUTO: 0 % (ref 0–2)
LYMPHOCYTES # BLD AUTO: 1.26 THOUSANDS/ΜL (ref 0.6–4.47)
LYMPHOCYTES NFR BLD AUTO: 27 % (ref 14–44)
MCH RBC QN AUTO: 29.6 PG (ref 26.8–34.3)
MCHC RBC AUTO-ENTMCNC: 34.6 G/DL (ref 31.4–37.4)
MCV RBC AUTO: 86 FL (ref 82–98)
MONOCYTES # BLD AUTO: 0.38 THOUSAND/ΜL (ref 0.17–1.22)
MONOCYTES NFR BLD AUTO: 8 % (ref 4–12)
NEUTROPHILS # BLD AUTO: 2.91 THOUSANDS/ΜL (ref 1.85–7.62)
NEUTS SEG NFR BLD AUTO: 61 % (ref 43–75)
NRBC BLD AUTO-RTO: 0 /100 WBCS
PLATELET # BLD AUTO: 239 THOUSANDS/UL (ref 149–390)
PMV BLD AUTO: 9.5 FL (ref 8.9–12.7)
POTASSIUM SERPL-SCNC: 3.7 MMOL/L (ref 3.5–5.3)
RBC # BLD AUTO: 4.83 MILLION/UL (ref 3.88–5.62)
SODIUM SERPL-SCNC: 134 MMOL/L (ref 136–145)
WBC # BLD AUTO: 4.72 THOUSAND/UL (ref 4.31–10.16)

## 2019-09-21 PROCEDURE — 85025 COMPLETE CBC W/AUTO DIFF WBC: CPT | Performed by: INTERNAL MEDICINE

## 2019-09-21 PROCEDURE — 99239 HOSP IP/OBS DSCHRG MGMT >30: CPT | Performed by: INTERNAL MEDICINE

## 2019-09-21 PROCEDURE — 80048 BASIC METABOLIC PNL TOTAL CA: CPT | Performed by: INTERNAL MEDICINE

## 2019-09-21 PROCEDURE — 82948 REAGENT STRIP/BLOOD GLUCOSE: CPT

## 2019-09-21 RX ORDER — TAMSULOSIN HYDROCHLORIDE 0.4 MG/1
0.4 CAPSULE ORAL
Qty: 30 CAPSULE | Refills: 0 | Status: SHIPPED | OUTPATIENT
Start: 2019-09-21

## 2019-09-21 RX ORDER — CEPHALEXIN 500 MG/1
500 CAPSULE ORAL EVERY 6 HOURS SCHEDULED
Qty: 24 CAPSULE | Refills: 0 | Status: SHIPPED | OUTPATIENT
Start: 2019-09-21 | End: 2019-09-26

## 2019-09-21 RX ADMIN — CEPHALEXIN 500 MG: 500 CAPSULE ORAL at 12:08

## 2019-09-21 RX ADMIN — INSULIN LISPRO 6 UNITS: 100 INJECTION, SOLUTION INTRAVENOUS; SUBCUTANEOUS at 08:36

## 2019-09-21 RX ADMIN — ENOXAPARIN SODIUM 40 MG: 40 INJECTION SUBCUTANEOUS at 08:36

## 2019-09-21 RX ADMIN — AZELASTINE HYDROCHLORIDE 2 SPRAY: 137 SPRAY, METERED NASAL at 08:35

## 2019-09-21 RX ADMIN — CEPHALEXIN 500 MG: 500 CAPSULE ORAL at 06:34

## 2019-09-21 RX ADMIN — CEPHALEXIN 500 MG: 500 CAPSULE ORAL at 01:42

## 2019-09-21 NOTE — NURSING NOTE
Patient declined lantus x3  Blood sugar 315  Patient educated on side effects of not taking lantus in replace of his metformin and hyperglycemia  Patient stated understanding but declined  Stated "it gives me a headache " Offered and declined tylenol  "It didn't work yesterday ' Patient educated that if blood sugar becomes uncontrolled that it could prolong his hospital stay  Patient stated "I doubt it " Radha RAINEY notified and is aware  Patient accepted his sliding scale insulin, humalog

## 2019-09-21 NOTE — NURSING NOTE
Pt left via walking with a steady gait accompanied by his son  Jude dc and intact  Discharge instructions reviewed with pt  Prescriptions called to pharmacy  Pt refused smoking cessation education  Pt refused pneumonia vaccine  No further questions at this time

## 2019-09-21 NOTE — PLAN OF CARE
Problem: METABOLIC, FLUID AND ELECTROLYTES - ADULT  Goal: Electrolytes maintained within normal limits  Description  INTERVENTIONS:  - Monitor labs and assess patient for signs and symptoms of electrolyte imbalances  - Administer electrolyte replacement as ordered  - Monitor response to electrolyte replacements, including repeat lab results as appropriate  - Instruct patient on fluid and nutrition as appropriate  Outcome: Progressing  Goal: Fluid balance maintained  Description  INTERVENTIONS:  - Monitor labs   - Monitor I/O and WT  - Instruct patient on fluid and nutrition as appropriate  - Assess for signs & symptoms of volume excess or deficit  Outcome: Progressing  Goal: Glucose maintained within target range  Description  INTERVENTIONS:  - Monitor Blood Glucose as ordered  - Assess for signs and symptoms of hyperglycemia and hypoglycemia  - Administer ordered medications to maintain glucose within target range  - Assess nutritional intake and initiate nutrition service referral as needed  Outcome: Progressing     Problem: PAIN - ADULT  Goal: Verbalizes/displays adequate comfort level or baseline comfort level  Description  Interventions:  - Encourage patient to monitor pain and request assistance  - Assess pain using appropriate pain scale  - Administer analgesics based on type and severity of pain and evaluate response  - Implement non-pharmacological measures as appropriate and evaluate response  - Consider cultural and social influences on pain and pain management  - Notify physician/advanced practitioner if interventions unsuccessful or patient reports new pain  Outcome: Progressing     Problem: INFECTION - ADULT  Goal: Absence or prevention of progression during hospitalization  Description  INTERVENTIONS:  - Assess and monitor for signs and symptoms of infection  - Monitor lab/diagnostic results  - Monitor all insertion sites, i e  indwelling lines, tubes, and drains  - [place] appropriate cooling/warming therapies per order  - Administer medications as ordered  - Instruct and encourage patient and family to use good hand hygiene technique  - Identify and instruct in appropriate isolation precautions for identified infection/condition   Outcome: Progressing     Problem: SAFETY ADULT  Goal: Patient will remain free of falls  Description  INTERVENTIONS:  - Assess patient frequently for physical needs  -  Identify cognitive and physical deficits and behaviors that affect risk of falls    -  [place] fall precautions as indicated by assessment   - Educate patient/family on patient safety including physical limitations  - Instruct patient to call for assistance with activity based on assessment  - Modify environment to reduce risk of injury  - Consider OT/PT consult to assist with strengthening/mobility  Outcome: Progressing  Goal: Maintain or return to baseline ADL function  Description  INTERVENTIONS:  -  Assess patient's ability to carry out ADLs; assess patient's baseline for ADL function and identify physical deficits which impact ability to perform ADLs (bathing, care of mouth/teeth, toileting, grooming, dressing, etc )  - Assess/evaluate cause of self-care deficits   - Assess range of motion  - Assess patient's mobility; develop plan if impaired  - Assess patient's need for assistive devices and provide as appropriate  - Encourage maximum independence but intervene and supervise when necessary  - Involve family in performance of ADLs  - Assess for home care needs following discharge   - Consider OT consult to assist with ADL evaluation and planning for discharge  - Provide patient education as appropriate  Outcome: Progressing  Goal: Maintain or return mobility status to optimal level  Description  INTERVENTIONS:  - Assess patient's baseline mobility status (ambulation, transfers, stairs, etc )    - Identify cognitive and physical deficits and behaviors that affect mobility  - Identify mobility aids required to assist with transfers and/or ambulation (gait belt, sit-to-stand, lift, walker, cane, etc )  - [place] fall precautions as indicated by assessment  - Record patient progress and toleration of activity level on Mobility SBAR; progress patient to next Phase/Stage  - Instruct patient to call for assistance with activity based on assessment  - Consider rehabilitation consult to assist with strengthening/weightbearing, etc   Outcome: Progressing     Problem: DISCHARGE PLANNING  Goal: Discharge to home or other facility with appropriate resources  Description  INTERVENTIONS:  - Identify barriers to discharge w/patient and caregiver  - Arrange for needed discharge resources and transportation as appropriate  - Identify discharge learning needs (meds, wound care, etc )  - Arrange for interpretive services to assist at discharge as needed  - Refer to Case Management Department for coordinating discharge planning if the patient needs post-hospital services based on physician/advanced practitioner order or complex needs related to functional status, cognitive ability, or social support system  Outcome: Progressing     Problem: Knowledge Deficit  Goal: Patient/family/caregiver demonstrates understanding of disease process, treatment plan, medications, and discharge instructions  Description  Complete learning assessment and assess knowledge base  Interventions:  - Provide teaching at level of understanding  - Provide teaching via preferred learning methods  Outcome: Progressing     Problem: Potential for Falls  Goal: Patient will remain free of falls  Description  INTERVENTIONS:  - Assess patient frequently for physical needs  -  Identify cognitive and physical deficits and behaviors that affect risk of falls    -  [place] fall precautions as indicated by assessment   - Educate patient/family on patient safety including physical limitations  - Instruct patient to call for assistance with activity based on assessment  - Modify environment to reduce risk of injury  - Consider OT/PT consult to assist with strengthening/mobility  Outcome: Progressing

## 2019-09-21 NOTE — DISCHARGE SUMMARY
Discharge- [facility] 1955, 60 y o  male MRN: [id number]    Unit/Bed#: [address] [place] [address] Encounter: [id number]    Primary Care Provider: No primary care provider on file  Date and time admitted to hospital: 9/18/2019 12:34 AM        Septic shock ([facility] )  Assessment & Plan  · As evidenced by fever, tachycardia, elevated lactic acid and hypotension upon presentation  · Patient had elevated procalcitonin level up to 20 which is trending down  · Patient needed pressor support briefly in the ICU  · Patient's urine and blood cultures grew E coli  · Secondary to E coli bacteremia  · Patient is on IV Rocephin 2 gram daily and will be transitioned to Keflex 100 milligram p o  Q 6 hours to finish a 10 day course  · Id consult appreciated  · CT scan of the abdomen showed 8 millimeter calculus in the right kidney without any hydronephrosis  · Urology consult appreciated  · Patient will need outpatient follow-up with lithotripsy and possible stent placement once infection has cleared up  FLIP (acute kidney injury) ([facility] )  Assessment & Plan  Resolved with IV hydration    Type 2 diabetes mellitus with hyperglycemia, without long-term current use of insulin (HCC)  Assessment & Plan  Metformin is on hold  Hemoglobin A1c was 8 3  Resume metformin upon discharge        Hyperlipidemia  Assessment & Plan  Outpatient follow up with PCP    Enlarged prostate  Assessment & Plan  Follow up outpatient with Urology  Continue [facility]        Discharging Physician / Practitioner: Reji Barnes MD  PCP: No primary care provider on file    Admission Date: 9/18/2019  Discharge Date: 09/21/19    Reason for Admission: Fever - 9 weeks to 74 years (Pt states he woke up with shivering, pt states he has no other symptoms )        Resolved Problems  Date Reviewed: 9/21/2019          Resolved    Hypokalemia 9/19/2019     Resolved by  BRIGID Hagan    Hypotension 9/19/2019     Resolved by  BRIGID Hagan Consultations During Hospital Stay:  [facility] CRITICAL CARE  IP CONSULT TO CASE MANAGEMENT  IP CONSULT TO INFECTIOUS DISEASES  IP CONSULT TO INFECTIOUS DISEASES    Procedures Performed:     · Urine cultures grew E coli and blood cultures x2 grew E coli    Significant Findings / Test Results:     ·   Results from last 7 days   Lab Units 09/21/19  0545 09/19/19  0456 09/18/19  0513   WBC Thousand/uL 4 72 9 70 12 39*  11 81*   HEMOGLOBIN g/dL 14 3 12 4 12 5  12 3   PLATELETS Thousands/uL 239 179 208  195     Results from last 7 days   Lab Units 09/21/19  0545 09/19/19  0456 09/18/19  0513 09/18/19  0044   SODIUM mmol/L 134* 138 139 139   POTASSIUM mmol/L 3 7 3 9 3 6 3 4*   CHLORIDE mmol/L 101 107 108 100   CO2 mmol/L 23 23 23 27   BUN mg/dL 7 8 9 10   CREATININE mg/dL 0 94 1 12 1 26 1 43*   CALCIUM mg/dL 8 4 7 5* 7 3* 8 6   TOTAL BILIRUBIN mg/dL  --   --  1 00 1 40*   ALK PHOS U/L  --   --  91 136*   ALT U/L  --   --  32 38   AST U/L  --   --  27 33     Results from last 7 days   Lab Units 09/18/19  0044   INR  0 98     Results from last 7 days   Lab Units 09/18/19  1130 09/18/19  0927   TROPONIN I ng/mL <0 02 <0 02     Lab Results   Component Value Date/Time    HGBA1C 8 3 (H) 09/18/2019 05:13 AM    HGBA1C 8 3 (H) 09/18/2019 05:13 AM     Results from last 7 days   Lab Units 09/21/19  1117 09/21/19  0711 09/20/19  2104 09/20/19  1555 09/20/19  1126 09/20/19  0658 09/20/19  0436 09/19/19  2129 09/19/19  1614 09/19/19  1132 09/18/19 2125 09/18/19  1622   POC GLUCOSE mg/dl 291* 251* 315* 214* 314* 207* 204* 223* 184* 253* 228* 277*     Results from last 7 days   Lab Units 09/19/19  0456 09/18/19  1130 09/18/19  0928 09/18/19  0926 09/18/19  0703 09/18/19  0513   LACTIC ACID mmol/L  --  1 6 2 1*  --  2 6* 2 1*   PROCALCITONIN ng/ml 18 88*  --   --  20 16*  --  15 51*     Blood Culture:   Lab Results   Component Value Date    BLOODCX Escherichia coli (A) 09/18/2019    BLOODCX Escherichia coli (A) 09/18/2019     Urine Culture:   Lab Results   Component Value Date    URINECX >100,000 cfu/ml Escherichia coli (A) 09/18/2019     Sputum Culture: No components found for: SPUTUMCX  Wound Culture: No results found for: WOUNDCULT     XR abdomen 1 view kub   Final Result by Norbert Solorzano DO (09/20 1100)   Stable right renal calcifications  Workstation performed: [id number]         CT renal stone study abdomen pelvis without contrast   Final Result by Catherine Barrios MD (09/18 8176)      8mm calculus in the right kidney  No hydronephrosis  Note, CT without contrast is insensitive for the detection of pyelonephritis, correlate with urinalysis  Workstation performed: [id number]         XR chest 1 view portable   ED Interpretation by Azael Jean-Baptiste DO (09/18 0126)   nad      Final Result by Hannah Locke MD (09/18 8614)      No acute cardiopulmonary disease  Workstation performed: [id number]              Outpatient Tests Requested:  · Follow-up with PCP in 1 week  Patient will also need outpatient follow-up with Urology in 1 week    Complications:  None    Reason for Admission:   Chief Complaint   Patient presents with    Fever - 9 weeks to 74 years     Pt states he woke up with shivering, pt states he has no other symptoms  Hospital Course:     Traci Mccloud is a 60 y o  male patient with a PMH of  diabetes and glaucoma who originally presented to the hospital on 9/18/2019 due to fever associated with chills  In the ED patient had a fever of 102 3 with tachycardia tachypnea and patient's UA was abnormal   CT scan of the abdomen showed 8 millimeter calculus in the right kidney without any hydronephrosis  Patient continued remained despite receiving IV hydration and patient was transferred to step-down unit and was placed on Levophed  Eventually patient's urine cultures and blood cultures grew E coli    Patient received IV Rocephin with significant improvement of stool symptoms  Patient was also seen by Urology and was started on Flomax for his BPH  Patient also seen by ID  Patient will be transitioned to Keflex 500 milligram p o  4 times a day finish a 10 day course  Patient also reported some constipation which resolved  Please see above list of diagnoses and related plan for additional information  Condition at Discharge: stable       Discharge Day Visit / Exam:     Subjective:  Patient complains of frequency with urination otherwise denies any burning urination  Patient denies any abdominal pain, chest pain, shortness of breath, nausea or vomiting  Patient had a bowel movement  Vitals: Blood Pressure: 115/68 (09/21/19 0834)  Pulse: 85 (09/21/19 0834)  Temperature: 98 5 °F (36 9 °C) (09/21/19 0834)  Temp Source: Temporal (09/21/19 0834)  Respirations: 18 (09/21/19 0834)  Height: 5' 9 5" (176 5 cm) (09/18/19 0300)  Weight - Scale: 92 5 kg (203 lb 14 4 oz) (09/21/19 0600)  SpO2: 98 % (09/21/19 0834)  Exam:   Physical Exam   Constitutional: No distress  HENT:   Head: Normocephalic and atraumatic  Eyes: Pupils are equal, round, and reactive to light  Conjunctivae are normal    Neck: Normal range of motion  Neck supple  Cardiovascular: Normal rate, regular rhythm and normal heart sounds  Pulmonary/Chest: Effort normal  No respiratory distress  He has no wheezes  He has no rhonchi  He has no rales  He exhibits no tenderness  Abdominal: Soft  Bowel sounds are normal  He exhibits no distension  There is no tenderness  There is no rebound and no guarding  Musculoskeletal: He exhibits no edema  Neurological: He is alert  No cranial nerve deficit  Skin: Skin is warm and dry  No rash noted  Discharge instructions/Information to patient and family:   See after visit summary for information provided to patient and family        Provisions for Follow-Up Care:  See after visit summary for information related to follow-up care and any pertinent home health orders  Disposition:     Home    Planned Readmission: No     Discharge Statement:  I spent 35 minutes discharging the patient  This time was spent on the day of discharge  I had direct contact with the patient on the day of discharge  Greater than 50% of the total time was spent examining patient, answering all patient questions, arranging and discussing plan of care with patient as well as directly providing post-discharge instructions  Additional time then spent on discharge activities  Discharge Medications:  See after visit summary for reconciled discharge medications provided to patient and family        ** Please Note: This note has been constructed using a voice recognition system **

## 2019-09-21 NOTE — PLAN OF CARE
Problem: METABOLIC, FLUID AND ELECTROLYTES - ADULT  Goal: Electrolytes maintained within normal limits  Description  INTERVENTIONS:  - Monitor labs and assess patient for signs and symptoms of electrolyte imbalances  - Administer electrolyte replacement as ordered  - Monitor response to electrolyte replacements, including repeat lab results as appropriate  - Instruct patient on fluid and nutrition as appropriate  Outcome: Progressing  Goal: Fluid balance maintained  Description  INTERVENTIONS:  - Monitor labs   - Monitor I/O and WT  - Instruct patient on fluid and nutrition as appropriate  - Assess for signs & symptoms of volume excess or deficit  Outcome: Progressing  Goal: Glucose maintained within target range  Description  INTERVENTIONS:  - Monitor Blood Glucose as ordered  - Assess for signs and symptoms of hyperglycemia and hypoglycemia  - Administer ordered medications to maintain glucose within target range  - Assess nutritional intake and initiate nutrition service referral as needed  Outcome: Progressing     Problem: PAIN - ADULT  Goal: Verbalizes/displays adequate comfort level or baseline comfort level  Description  Interventions:  - Encourage patient to monitor pain and request assistance  - Assess pain using appropriate pain scale  - Administer analgesics based on type and severity of pain and evaluate response  - Implement non-pharmacological measures as appropriate and evaluate response  - Consider cultural and social influences on pain and pain management  - Notify physician/advanced practitioner if interventions unsuccessful or patient reports new pain  Outcome: Progressing     Problem: INFECTION - ADULT  Goal: Absence or prevention of progression during hospitalization  Description  INTERVENTIONS:  - Assess and monitor for signs and symptoms of infection  - Monitor lab/diagnostic results  - Monitor all insertion sites, i e  indwelling lines, tubes, and drains  - [place] appropriate cooling/warming therapies per order  - Administer medications as ordered  - Instruct and encourage patient and family to use good hand hygiene technique  - Identify and instruct in appropriate isolation precautions for identified infection/condition   Outcome: Progressing     Problem: SAFETY ADULT  Goal: Patient will remain free of falls  Description  INTERVENTIONS:  - Assess patient frequently for physical needs  -  Identify cognitive and physical deficits and behaviors that affect risk of falls    -  [place] fall precautions as indicated by assessment   - Educate patient/family on patient safety including physical limitations  - Instruct patient to call for assistance with activity based on assessment  - Modify environment to reduce risk of injury  - Consider OT/PT consult to assist with strengthening/mobility  Outcome: Progressing  Goal: Maintain or return to baseline ADL function  Description  INTERVENTIONS:  -  Assess patient's ability to carry out ADLs; assess patient's baseline for ADL function and identify physical deficits which impact ability to perform ADLs (bathing, care of mouth/teeth, toileting, grooming, dressing, etc )  - Assess/evaluate cause of self-care deficits   - Assess range of motion  - Assess patient's mobility; develop plan if impaired  - Assess patient's need for assistive devices and provide as appropriate  - Encourage maximum independence but intervene and supervise when necessary  - Involve family in performance of ADLs  - Assess for home care needs following discharge   - Consider OT consult to assist with ADL evaluation and planning for discharge  - Provide patient education as appropriate  Outcome: Progressing  Goal: Maintain or return mobility status to optimal level  Description  INTERVENTIONS:  - Assess patient's baseline mobility status (ambulation, transfers, stairs, etc )    - Identify cognitive and physical deficits and behaviors that affect mobility  - Identify mobility aids required to assist with transfers and/or ambulation (gait belt, sit-to-stand, lift, walker, cane, etc )  - [place] fall precautions as indicated by assessment  - Record patient progress and toleration of activity level on Mobility SBAR; progress patient to next Phase/Stage  - Instruct patient to call for assistance with activity based on assessment  - Consider rehabilitation consult to assist with strengthening/weightbearing, etc   Outcome: Progressing     Problem: DISCHARGE PLANNING  Goal: Discharge to home or other facility with appropriate resources  Description  INTERVENTIONS:  - Identify barriers to discharge w/patient and caregiver  - Arrange for needed discharge resources and transportation as appropriate  - Identify discharge learning needs (meds, wound care, etc )  - Arrange for interpretive services to assist at discharge as needed  - Refer to Case Management Department for coordinating discharge planning if the patient needs post-hospital services based on physician/advanced practitioner order or complex needs related to functional status, cognitive ability, or social support system  Outcome: Progressing     Problem: Knowledge Deficit  Goal: Patient/family/caregiver demonstrates understanding of disease process, treatment plan, medications, and discharge instructions  Description  Complete learning assessment and assess knowledge base  Interventions:  - Provide teaching at level of understanding  - Provide teaching via preferred learning methods  Outcome: Progressing     Problem: Potential for Falls  Goal: Patient will remain free of falls  Description  INTERVENTIONS:  - Assess patient frequently for physical needs  -  Identify cognitive and physical deficits and behaviors that affect risk of falls    -  [place] fall precautions as indicated by assessment   - Educate patient/family on patient safety including physical limitations  - Instruct patient to call for assistance with activity based on assessment  - Modify environment to reduce risk of injury  - Consider OT/PT consult to assist with strengthening/mobility  Outcome: Progressing

## 2019-09-21 NOTE — PLAN OF CARE
Problem: METABOLIC, FLUID AND ELECTROLYTES - ADULT  Goal: Electrolytes maintained within normal limits  Description  INTERVENTIONS:  - Monitor labs and assess patient for signs and symptoms of electrolyte imbalances  - Administer electrolyte replacement as ordered  - Monitor response to electrolyte replacements, including repeat lab results as appropriate  - Instruct patient on fluid and nutrition as appropriate  9/21/2019 1128 by Michael Lindsay RN  Outcome: Adequate for Discharge  9/21/2019 1015 by Michael Lindsay RN  Outcome: Progressing  Goal: Fluid balance maintained  Description  INTERVENTIONS:  - Monitor labs   - Monitor I/O and WT  - Instruct patient on fluid and nutrition as appropriate  - Assess for signs & symptoms of volume excess or deficit  9/21/2019 1128 by Michael Lindsay RN  Outcome: Adequate for Discharge  9/21/2019 1015 by Michael Lindsay RN  Outcome: Progressing  Goal: Glucose maintained within target range  Description  INTERVENTIONS:  - Monitor Blood Glucose as ordered  - Assess for signs and symptoms of hyperglycemia and hypoglycemia  - Administer ordered medications to maintain glucose within target range  - Assess nutritional intake and initiate nutrition service referral as needed  9/21/2019 1128 by Michael Lindsay RN  Outcome: Adequate for Discharge  9/21/2019 1015 by Michael Lindsay RN  Outcome: Progressing     Problem: PAIN - ADULT  Goal: Verbalizes/displays adequate comfort level or baseline comfort level  Description  Interventions:  - Encourage patient to monitor pain and request assistance  - Assess pain using appropriate pain scale  - Administer analgesics based on type and severity of pain and evaluate response  - Implement non-pharmacological measures as appropriate and evaluate response  - Consider cultural and social influences on pain and pain management  - Notify physician/advanced practitioner if interventions unsuccessful or patient reports new pain  9/21/2019 1128 by Missy Bustamante RN  Outcome: Adequate for Discharge  9/21/2019 1015 by Missy Bustamante RN  Outcome: Progressing     Problem: INFECTION - ADULT  Goal: Absence or prevention of progression during hospitalization  Description  INTERVENTIONS:  - Assess and monitor for signs and symptoms of infection  - Monitor lab/diagnostic results  - Monitor all insertion sites, i e  indwelling lines, tubes, and drains  - [place] appropriate cooling/warming therapies per order  - Administer medications as ordered  - Instruct and encourage patient and family to use good hand hygiene technique  - Identify and instruct in appropriate isolation precautions for identified infection/condition   9/21/2019 1128 by Missy Bustamante RN  Outcome: Adequate for Discharge  9/21/2019 1015 by Missy Bustamante RN  Outcome: Progressing     Problem: SAFETY ADULT  Goal: Patient will remain free of falls  Description  INTERVENTIONS:  - Assess patient frequently for physical needs  -  Identify cognitive and physical deficits and behaviors that affect risk of falls    -  [place] fall precautions as indicated by assessment   - Educate patient/family on patient safety including physical limitations  - Instruct patient to call for assistance with activity based on assessment  - Modify environment to reduce risk of injury  - Consider OT/PT consult to assist with strengthening/mobility  9/21/2019 1128 by Missy Bustamante RN  Outcome: Adequate for Discharge  9/21/2019 1015 by Missy Bustamante RN  Outcome: Progressing  Goal: Maintain or return to baseline ADL function  Description  INTERVENTIONS:  -  Assess patient's ability to carry out ADLs; assess patient's baseline for ADL function and identify physical deficits which impact ability to perform ADLs (bathing, care of mouth/teeth, toileting, grooming, dressing, etc )  - Assess/evaluate cause of self-care deficits   - Assess range of motion  - Assess patient's mobility; develop plan if impaired  - Assess patient's need for assistive devices and provide as appropriate  - Encourage maximum independence but intervene and supervise when necessary  - Involve family in performance of ADLs  - Assess for home care needs following discharge   - Consider OT consult to assist with ADL evaluation and planning for discharge  - Provide patient education as appropriate  9/21/2019 1128 by Senaida Gowers, RN  Outcome: Adequate for Discharge  9/21/2019 1015 by Senaida Gowers, RN  Outcome: Progressing  Goal: Maintain or return mobility status to optimal level  Description  INTERVENTIONS:  - Assess patient's baseline mobility status (ambulation, transfers, stairs, etc )    - Identify cognitive and physical deficits and behaviors that affect mobility  - Identify mobility aids required to assist with transfers and/or ambulation (gait belt, sit-to-stand, lift, walker, cane, etc )  - [place] fall precautions as indicated by assessment  - Record patient progress and toleration of activity level on Mobility SBAR; progress patient to next Phase/Stage  - Instruct patient to call for assistance with activity based on assessment  - Consider rehabilitation consult to assist with strengthening/weightbearing, etc   9/21/2019 1128 by Senaida Gowers, RN  Outcome: Adequate for Discharge  9/21/2019 1015 by Senaida Gowers, RN  Outcome: Progressing     Problem: DISCHARGE PLANNING  Goal: Discharge to home or other facility with appropriate resources  Description  INTERVENTIONS:  - Identify barriers to discharge w/patient and caregiver  - Arrange for needed discharge resources and transportation as appropriate  - Identify discharge learning needs (meds, wound care, etc )  - Arrange for interpretive services to assist at discharge as needed  - Refer to Case Management Department for coordinating discharge planning if the patient needs post-hospital services based on physician/advanced practitioner order or complex needs related to functional status, cognitive ability, or social support system  9/21/2019 1128 by Ashish Simms RN  Outcome: Adequate for Discharge  9/21/2019 1015 by Ashish Simms RN  Outcome: Progressing     Problem: Knowledge Deficit  Goal: Patient/family/caregiver demonstrates understanding of disease process, treatment plan, medications, and discharge instructions  Description  Complete learning assessment and assess knowledge base  Interventions:  - Provide teaching at level of understanding  - Provide teaching via preferred learning methods  9/21/2019 1128 by Ashish Simms RN  Outcome: Adequate for Discharge  9/21/2019 1015 by Ashish Simms RN  Outcome: Progressing     Problem: Potential for Falls  Goal: Patient will remain free of falls  Description  INTERVENTIONS:  - Assess patient frequently for physical needs  -  Identify cognitive and physical deficits and behaviors that affect risk of falls    -  [place] fall precautions as indicated by assessment   - Educate patient/family on patient safety including physical limitations  - Instruct patient to call for assistance with activity based on assessment  - Modify environment to reduce risk of injury  - Consider OT/PT consult to assist with strengthening/mobility  9/21/2019 1128 by Ashish Simms RN  Outcome: Adequate for Discharge  9/21/2019 1015 by Ashish Simms RN  Outcome: Progressing

## 2019-09-23 NOTE — UTILIZATION REVIEW
Notification of Discharge  This is a Notification of Discharge from our facility [address]  Please be advised that this patient has been discharge from our facility  Below you will find the admission and discharge date and time including the patients disposition  PRESENTATION DATE: 9/18/2019 12:34 AM  OBS ADMISSION DATE:   IP ADMISSION DATE: 9/18/19 0222   DISCHARGE DATE: 9/21/2019 12:12 PM  DISPOSITION: Home/Self Care Home/Self Care   Admission Orders listed below:  Admission Orders (From admission, onward)     Ordered        09/18/19 0223  Inpatient Admission  Once                   Please contact the UR Department if additional information is required to close this patient's authorization/case  [facility] Department  Phone: [phone]; Fax [phone]  [email or web address]  ATTENTION: Please call with any questions or concerns to [phone]  and carefully listen to the prompts so that you are directed to the right person  Send all requests for admission clinical reviews, approved or denied determinations and any other requests to fax [phone]   All voicemails are confidential

## 2019-10-22 ENCOUNTER — HOSPITAL ENCOUNTER (OUTPATIENT)
Facility: HOSPITAL | Age: 64
Setting detail: OBSERVATION
Discharge: HOME | End: 2019-10-24
Attending: EMERGENCY MEDICINE | Admitting: FAMILY MEDICINE
Payer: COMMERCIAL

## 2019-10-22 ENCOUNTER — APPOINTMENT (EMERGENCY)
Dept: RADIOLOGY | Facility: HOSPITAL | Age: 64
End: 2019-10-22
Payer: COMMERCIAL

## 2019-10-22 DIAGNOSIS — K83.8 BILIARY SLUDGE: ICD-10-CM

## 2019-10-22 DIAGNOSIS — K85.90 PANCREATITIS: Primary | ICD-10-CM

## 2019-10-22 DIAGNOSIS — E78.2 MIXED HYPERLIPIDEMIA: ICD-10-CM

## 2019-10-22 PROBLEM — B96.20 E COLI BACTEREMIA: Status: RESOLVED | Noted: 2019-09-19 | Resolved: 2019-10-22

## 2019-10-22 PROBLEM — A41.9 SEPTIC SHOCK (HCC): Status: RESOLVED | Noted: 2019-09-18 | Resolved: 2019-10-22

## 2019-10-22 PROBLEM — R78.81 E COLI BACTEREMIA: Status: RESOLVED | Noted: 2019-09-19 | Resolved: 2019-10-22

## 2019-10-22 PROBLEM — N39.0 UTI (URINARY TRACT INFECTION): Status: RESOLVED | Noted: 2019-09-18 | Resolved: 2019-10-22

## 2019-10-22 PROBLEM — R65.21 SEPTIC SHOCK (HCC): Status: RESOLVED | Noted: 2019-09-18 | Resolved: 2019-10-22

## 2019-10-22 PROBLEM — N17.9 AKI (ACUTE KIDNEY INJURY) (HCC): Status: RESOLVED | Noted: 2019-09-18 | Resolved: 2019-10-22

## 2019-10-22 LAB
ALBUMIN SERPL BCP-MCNC: 3.7 G/DL (ref 3.5–5)
ALP SERPL-CCNC: 115 U/L (ref 46–116)
ALT SERPL W P-5'-P-CCNC: 20 U/L (ref 12–78)
ANION GAP SERPL CALCULATED.3IONS-SCNC: 10 MMOL/L (ref 4–13)
AST SERPL W P-5'-P-CCNC: 13 U/L (ref 5–45)
BASOPHILS # BLD AUTO: 0.06 THOUSANDS/ΜL (ref 0–0.1)
BASOPHILS NFR BLD AUTO: 1 % (ref 0–1)
BILIRUB SERPL-MCNC: 0.5 MG/DL (ref 0.2–1)
BILIRUB UR QL STRIP: NEGATIVE
BUN SERPL-MCNC: 10 MG/DL (ref 5–25)
CALCIUM SERPL-MCNC: 8.3 MG/DL (ref 8.3–10.1)
CHLORIDE SERPL-SCNC: 106 MMOL/L (ref 100–108)
CLARITY UR: CLEAR
CO2 SERPL-SCNC: 25 MMOL/L (ref 21–32)
COLOR UR: NORMAL
CREAT SERPL-MCNC: 1.12 MG/DL (ref 0.6–1.3)
EOSINOPHIL # BLD AUTO: 0.1 THOUSAND/ΜL (ref 0–0.61)
EOSINOPHIL NFR BLD AUTO: 2 % (ref 0–6)
ERYTHROCYTE [DISTWIDTH] IN BLOOD BY AUTOMATED COUNT: 12.6 % (ref 11.6–15.1)
GFR SERPL CREATININE-BSD FRML MDRD: 81 ML/MIN/1.73SQ M
GLUCOSE SERPL-MCNC: 152 MG/DL (ref 65–140)
GLUCOSE UR STRIP-MCNC: NEGATIVE MG/DL
HCT VFR BLD AUTO: 43.2 % (ref 36.5–49.3)
HGB BLD-MCNC: 14.9 G/DL (ref 12–17)
HGB UR QL STRIP.AUTO: NEGATIVE
IMM GRANULOCYTES # BLD AUTO: 0.02 THOUSAND/UL (ref 0–0.2)
IMM GRANULOCYTES NFR BLD AUTO: 0 % (ref 0–2)
KETONES UR STRIP-MCNC: NEGATIVE MG/DL
LEUKOCYTE ESTERASE UR QL STRIP: NEGATIVE
LIPASE SERPL-CCNC: 1783 U/L (ref 73–393)
LYMPHOCYTES # BLD AUTO: 2.26 THOUSANDS/ΜL (ref 0.6–4.47)
LYMPHOCYTES NFR BLD AUTO: 39 % (ref 14–44)
MCH RBC QN AUTO: 30 PG (ref 26.8–34.3)
MCHC RBC AUTO-ENTMCNC: 34.5 G/DL (ref 31.4–37.4)
MCV RBC AUTO: 87 FL (ref 82–98)
MONOCYTES # BLD AUTO: 0.4 THOUSAND/ΜL (ref 0.17–1.22)
MONOCYTES NFR BLD AUTO: 7 % (ref 4–12)
NEUTROPHILS # BLD AUTO: 2.99 THOUSANDS/ΜL (ref 1.85–7.62)
NEUTS SEG NFR BLD AUTO: 51 % (ref 43–75)
NITRITE UR QL STRIP: NEGATIVE
NRBC BLD AUTO-RTO: 0 /100 WBCS
PH UR STRIP.AUTO: 6 [PH]
PLATELET # BLD AUTO: 254 THOUSANDS/UL (ref 149–390)
PMV BLD AUTO: 9 FL (ref 8.9–12.7)
POTASSIUM SERPL-SCNC: 3.6 MMOL/L (ref 3.5–5.3)
PROT SERPL-MCNC: 6.8 G/DL (ref 6.4–8.2)
PROT UR STRIP-MCNC: NEGATIVE MG/DL
RBC # BLD AUTO: 4.97 MILLION/UL (ref 3.88–5.62)
SODIUM SERPL-SCNC: 141 MMOL/L (ref 136–145)
SP GR UR STRIP.AUTO: 1.01 (ref 1–1.03)
UROBILINOGEN UR QL STRIP.AUTO: 1 E.U./DL
WBC # BLD AUTO: 5.83 THOUSAND/UL (ref 4.31–10.16)

## 2019-10-22 PROCEDURE — 99285 EMERGENCY DEPT VISIT HI MDM: CPT

## 2019-10-22 PROCEDURE — 87081 CULTURE SCREEN ONLY: CPT | Performed by: NURSE PRACTITIONER

## 2019-10-22 PROCEDURE — 96360 HYDRATION IV INFUSION INIT: CPT

## 2019-10-22 PROCEDURE — 99220 PR INITIAL OBSERVATION CARE/DAY 70 MINUTES: CPT | Performed by: NURSE PRACTITIONER

## 2019-10-22 PROCEDURE — 74177 CT ABD & PELVIS W/CONTRAST: CPT

## 2019-10-22 PROCEDURE — 36415 COLL VENOUS BLD VENIPUNCTURE: CPT | Performed by: EMERGENCY MEDICINE

## 2019-10-22 PROCEDURE — 80053 COMPREHEN METABOLIC PANEL: CPT | Performed by: EMERGENCY MEDICINE

## 2019-10-22 PROCEDURE — 81003 URINALYSIS AUTO W/O SCOPE: CPT | Performed by: EMERGENCY MEDICINE

## 2019-10-22 PROCEDURE — 83690 ASSAY OF LIPASE: CPT | Performed by: EMERGENCY MEDICINE

## 2019-10-22 PROCEDURE — 96361 HYDRATE IV INFUSION ADD-ON: CPT

## 2019-10-22 PROCEDURE — 85025 COMPLETE CBC W/AUTO DIFF WBC: CPT | Performed by: EMERGENCY MEDICINE

## 2019-10-22 RX ORDER — TAMSULOSIN HYDROCHLORIDE 0.4 MG/1
0.4 CAPSULE ORAL
Status: DISCONTINUED | OUTPATIENT
Start: 2019-10-23 | End: 2019-10-24 | Stop reason: HOSPADM

## 2019-10-22 RX ORDER — SODIUM CHLORIDE, SODIUM LACTATE, POTASSIUM CHLORIDE, CALCIUM CHLORIDE 600; 310; 30; 20 MG/100ML; MG/100ML; MG/100ML; MG/100ML
125 INJECTION, SOLUTION INTRAVENOUS CONTINUOUS
Status: DISCONTINUED | OUTPATIENT
Start: 2019-10-22 | End: 2019-10-24 | Stop reason: HOSPADM

## 2019-10-22 RX ORDER — ONDANSETRON 2 MG/ML
4 INJECTION INTRAMUSCULAR; INTRAVENOUS EVERY 6 HOURS PRN
Status: DISCONTINUED | OUTPATIENT
Start: 2019-10-22 | End: 2019-10-24 | Stop reason: HOSPADM

## 2019-10-22 RX ADMIN — IOHEXOL 100 ML: 350 INJECTION, SOLUTION INTRAVENOUS at 17:39

## 2019-10-22 RX ADMIN — SODIUM CHLORIDE, SODIUM LACTATE, POTASSIUM CHLORIDE, AND CALCIUM CHLORIDE 125 ML/HR: .6; .31; .03; .02 INJECTION, SOLUTION INTRAVENOUS at 21:08

## 2019-10-22 RX ADMIN — SODIUM CHLORIDE 1000 ML: 0.9 INJECTION, SOLUTION INTRAVENOUS at 17:01

## 2019-10-22 NOTE — ASSESSMENT & PLAN NOTE
Patient presented to the ED with complaints of epigastric to right upper quadrant pain for two days which worsened today  No precipitating or exacerbating factors  No nausea, vomiting, fevers, chills or diarrhea  Labs in the ER revealed a lipase of 1783, otherwise unremarkable  CT of the A/P with no acute findings       Admit to medicine   NPO   Aggressive IV hydration   Trend labs - CBC, lipase   Antiemetics   Pain medication   Consider GI consult depending on clinical course

## 2019-10-22 NOTE — H&P
H&P- Jose Angel Chávez 1955, 60 y o  male MRN: [id number]    Unit/Bed#: [address] [place] [address] Encounter: [id number]    Primary Care Provider: Radha Knox MD   Date and time admitted to hospital: 10/22/2019  4:32 PM    * Pancreatitis  Assessment & Plan  Patient presented to the ED with complaints of epigastric to right upper quadrant pain for two days which worsened today  No precipitating or exacerbating factors  No nausea, vomiting, fevers, chills or diarrhea  Labs in the ER revealed a lipase of 1783, otherwise unremarkable  CT of the A/P with no acute findings   Admit to medicine   NPO   Aggressive IV hydration   Trend labs - CBC, lipase   Antiemetics   Pain medication   Consider GI consult depending on clinical course    Enlarged prostate  Assessment & Plan  Patient hospitalized last month for E  Coli bactermia secondary to UTI  Continue flomax  Outpatient follow up with urology    Hyperlipidemia  Assessment & Plan  Diet controlled  Outpatient follow up with PCP    Tobacco dependence  Assessment & Plan  Intermittent cigar use  Cessation reinforced    Type 2 diabetes mellitus with hyperglycemia, without long-term current use of insulin (HCC)  Assessment & Plan  Lab Results   Component Value Date    HGBA1C 8 3 (H) 09/18/2019    HGBA1C 8 3 (H) 09/18/2019       No results for input(s): POCGLU in the last 72 hours  Blood Sugar Average: Last 72 hrs:  Currently NPO  Hold metformin  Accuchecks AC/HS with insulin sliding scale coverage    VTE Prophylaxis: Enoxaparin (Lovenox)  / sequential compression device   Code Status: Level 1 - Full Code    Anticipated Length of Stay:  Patient will be admitted on an Observation basis with an anticipated length of stay of less than 2 midnights  Justification for Hospital Stay: pancreatitis    Total Time for Visit, including Counseling / Coordination of Care: 15 minutes    Greater than 50% of this total time spent on direct patient counseling and coordination of care     Chief Complaint:   Abdominal Pain (patient c/o lower abdominal pain x 3 days )      History of Present Illness:    Dondra Lombard is a 60 y o  male with a PMH of diabetes, glaucoma, hyperlipidemia who presents with abdominal pain  Patient states he developed abdominal pain about 2 days ago  Pain is intermittent with no precipitating or exacerbating factors  Pain is located in the epigastric to right upper quadrant areas  Feels like it is pressure bandlike at times  There is no associated nausea, vomiting, fevers or chills  The pain worsened today prompting him to come to the emergency department  In the ER labs were unremarkable aside from an elevated lipase of 1700  CT of the abdomen pelvis with no acute findings  Of note patient was recently hospitalized for E coli bacteremia  He still needs outpatient follow-up  Patient is admitted for observation  Review of Systems:    Review of Systems   Constitutional: Negative  Negative for chills and fever  HENT: Negative  Eyes: Negative  Respiratory: Negative  Cardiovascular: Negative  Gastrointestinal: Positive for abdominal pain  Negative for nausea and vomiting  Endocrine: Negative  Genitourinary: Negative  Musculoskeletal: Negative  Skin: Negative  Allergic/Immunologic: Negative  Neurological: Negative  Hematological: Negative  Psychiatric/Behavioral: Negative  Past Medical and Surgical History:     Past Medical History:   Diagnosis Date    BPH (benign prostatic hyperplasia)     Diabetes mellitus [facility] [address] )     Glaucoma        Past Surgical History:   Procedure Laterality Date    EYE SURGERY         Meds/Allergies:    Prior to Admission medications    Medication Sig Start Date End Date Taking?  Authorizing Provider   metFORMIN (GLUCOPHAGE) 500 mg tablet Take 500 mg by mouth 2 (two) times a day with meals   Yes Historical Provider, MD   tamsulosin (FLOMAX) 0 4 mg Take 1 capsule (0 4 mg total) by mouth daily with dinner 9/21/19  Yes Mark Boston MD       Allergies: Allergies   Allergen Reactions    Cat Hair Extract      Reaction Date: 03Dec2008;     Dust Mite Extract      Reaction Date: 03Dec2008;    Leopoldo Lugo Pollen Allergen      Reaction Date: 03Dec2008;     Tree Extract      Reaction Date: 03Dec2008; Social History:     Marital Status: Unknown   Substance Use History:   Social History     Substance and Sexual Activity   Alcohol Use Never    Frequency: Never     Social History     Tobacco Use   Smoking Status Current Some Day Smoker   Smokeless Tobacco Never Used   Tobacco Comment    rarely cigar     Social History     Substance and Sexual Activity   Drug Use Never       Family History:    Family History   Problem Relation Age of Onset    Diabetes Mother     Heart attack Mother     No Known Problems Brother     No Known Problems Brother     No Known Problems Brother        Physical Exam:     Vitals:   Blood Pressure: 131/71 (10/22/19 2002)  Pulse: 78 (10/22/19 2002)  Temperature: 97 8 °F (36 6 °C) (10/22/19 2002)  Temp Source: Oral (10/22/19 2002)  Respirations: 17 (10/22/19 2002)  Height: 5' 9 5" (176 5 cm) (10/22/19 2002)  Weight - Scale: 92 5 kg (203 lb 14 8 oz) (10/22/19 2002)  SpO2: 98 % (10/22/19 2002)    Physical Exam   Constitutional: He is oriented to person, place, and time  He appears well-developed and well-nourished  HENT:   Head: Normocephalic and atraumatic  Eyes: Pupils are equal, round, and reactive to light  EOM are normal    Neck: Normal range of motion  Cardiovascular: Normal rate, regular rhythm and normal heart sounds  Pulmonary/Chest: Effort normal and breath sounds normal    Abdominal: Soft  Bowel sounds are normal  He exhibits no distension  There is no tenderness (nontender to palpation)  Musculoskeletal: Normal range of motion  He exhibits no edema  Neurological: He is alert and oriented to person, place, and time  Skin: Skin is warm and dry  Psychiatric: He has a normal mood and affect  His behavior is normal    Nursing note and vitals reviewed  Additional Data:     Lab Results: I have personally reviewed pertinent reports  Results from last 7 days   Lab Units 10/22/19  1700   WBC Thousand/uL 5 83   HEMOGLOBIN g/dL 14 9   HEMATOCRIT % 43 2   PLATELETS Thousands/uL 254   NEUTROS PCT % 51     Results from last 7 days   Lab Units 10/22/19  1700   SODIUM mmol/L 141   POTASSIUM mmol/L 3 6   CHLORIDE mmol/L 106   CO2 mmol/L 25   BUN mg/dL 10   CREATININE mg/dL 1 12   CALCIUM mg/dL 8 3   TOTAL BILIRUBIN mg/dL 0 50   ALK PHOS U/L 115   ALT U/L 20   AST U/L 13                       Imaging: I have personally reviewed pertinent reports  CT abdomen pelvis with contrast   Final Result by Bushra Joseph MD (10/22 1805)      No acute inflammatory changes within the abdomen or the pelvis  Workstation performed: [id number]             CT abdomen pelvis with contrast   Final Result      No acute inflammatory changes within the abdomen or the pelvis  Workstation performed: [id number]             EKG, Pathology, and Other Studies Reviewed on Admission:   · EKG: pending    Allscripts / Epic Records Reviewed: Yes     ** Please Note: This note has been constructed using a voice recognition system   **

## 2019-10-23 ENCOUNTER — APPOINTMENT (OUTPATIENT)
Dept: RADIOLOGY | Facility: HOSPITAL | Age: 64
End: 2019-10-23
Payer: COMMERCIAL

## 2019-10-23 LAB
ALBUMIN SERPL BCP-MCNC: 3.2 G/DL (ref 3.5–5)
ALP SERPL-CCNC: 80 U/L (ref 46–116)
ALT SERPL W P-5'-P-CCNC: 19 U/L (ref 12–78)
ANION GAP SERPL CALCULATED.3IONS-SCNC: 10 MMOL/L (ref 4–13)
AST SERPL W P-5'-P-CCNC: 12 U/L (ref 5–45)
BILIRUB SERPL-MCNC: 0.7 MG/DL (ref 0.2–1)
BUN SERPL-MCNC: 6 MG/DL (ref 5–25)
CALCIUM SERPL-MCNC: 8.3 MG/DL (ref 8.3–10.1)
CHLORIDE SERPL-SCNC: 108 MMOL/L (ref 100–108)
CO2 SERPL-SCNC: 21 MMOL/L (ref 21–32)
CREAT SERPL-MCNC: 0.89 MG/DL (ref 0.6–1.3)
ERYTHROCYTE [DISTWIDTH] IN BLOOD BY AUTOMATED COUNT: 12.4 % (ref 11.6–15.1)
GFR SERPL CREATININE-BSD FRML MDRD: 106 ML/MIN/1.73SQ M
GLUCOSE SERPL-MCNC: 114 MG/DL (ref 65–140)
GLUCOSE SERPL-MCNC: 145 MG/DL (ref 65–140)
GLUCOSE SERPL-MCNC: 89 MG/DL (ref 65–140)
GLUCOSE SERPL-MCNC: 95 MG/DL (ref 65–140)
GLUCOSE SERPL-MCNC: 95 MG/DL (ref 65–140)
GLUCOSE SERPL-MCNC: 97 MG/DL (ref 65–140)
HCT VFR BLD AUTO: 41.3 % (ref 36.5–49.3)
HGB BLD-MCNC: 14.1 G/DL (ref 12–17)
LIPASE SERPL-CCNC: 451 U/L (ref 73–393)
MCH RBC QN AUTO: 29.9 PG (ref 26.8–34.3)
MCHC RBC AUTO-ENTMCNC: 34.1 G/DL (ref 31.4–37.4)
MCV RBC AUTO: 88 FL (ref 82–98)
PLATELET # BLD AUTO: 230 THOUSANDS/UL (ref 149–390)
PMV BLD AUTO: 9.2 FL (ref 8.9–12.7)
POTASSIUM SERPL-SCNC: 3.7 MMOL/L (ref 3.5–5.3)
PROT SERPL-MCNC: 5.9 G/DL (ref 6.4–8.2)
RBC # BLD AUTO: 4.71 MILLION/UL (ref 3.88–5.62)
SODIUM SERPL-SCNC: 139 MMOL/L (ref 136–145)
WBC # BLD AUTO: 5.63 THOUSAND/UL (ref 4.31–10.16)

## 2019-10-23 PROCEDURE — 99225 PR SBSQ OBSERVATION CARE/DAY 25 MINUTES: CPT | Performed by: INTERNAL MEDICINE

## 2019-10-23 PROCEDURE — 99205 OFFICE O/P NEW HI 60 MIN: CPT | Performed by: INTERNAL MEDICINE

## 2019-10-23 PROCEDURE — 82948 REAGENT STRIP/BLOOD GLUCOSE: CPT

## 2019-10-23 PROCEDURE — 83690 ASSAY OF LIPASE: CPT | Performed by: NURSE PRACTITIONER

## 2019-10-23 PROCEDURE — 85027 COMPLETE CBC AUTOMATED: CPT | Performed by: NURSE PRACTITIONER

## 2019-10-23 PROCEDURE — 80053 COMPREHEN METABOLIC PANEL: CPT | Performed by: NURSE PRACTITIONER

## 2019-10-23 PROCEDURE — 76705 ECHO EXAM OF ABDOMEN: CPT

## 2019-10-23 RX ORDER — POLYVINYL ALCOHOL 14 MG/ML
1 SOLUTION/ DROPS OPHTHALMIC
Status: DISCONTINUED | OUTPATIENT
Start: 2019-10-23 | End: 2019-10-23 | Stop reason: CLARIF

## 2019-10-23 RX ADMIN — SODIUM CHLORIDE, SODIUM LACTATE, POTASSIUM CHLORIDE, AND CALCIUM CHLORIDE 125 ML/HR: .6; .31; .03; .02 INJECTION, SOLUTION INTRAVENOUS at 04:04

## 2019-10-23 RX ADMIN — TAMSULOSIN HYDROCHLORIDE 0.4 MG: 0.4 CAPSULE ORAL at 17:59

## 2019-10-23 RX ADMIN — SODIUM CHLORIDE, SODIUM LACTATE, POTASSIUM CHLORIDE, AND CALCIUM CHLORIDE 125 ML/HR: .6; .31; .03; .02 INJECTION, SOLUTION INTRAVENOUS at 13:52

## 2019-10-23 NOTE — ASSESSMENT & PLAN NOTE
Lab Results   Component Value Date    HGBA1C 8 3 (H) 09/18/2019    HGBA1C 8 3 (H) 09/18/2019       Recent Labs     10/23/19  1616 10/23/19  2012 10/24/19  0711 10/24/19  1059   POCGLU 89 145* 133 184*       Blood Sugar Average: Last 72 hrs:  (P) 122 [id number]     Resume metformin at the time of discharge  Emphasized diet and lifestyle modification

## 2019-10-23 NOTE — PLAN OF CARE
Problem: PAIN - ADULT  Goal: Verbalizes/displays adequate comfort level or baseline comfort level  Description  Interventions:  - Encourage patient to monitor pain and request assistance  - Assess pain using appropriate pain scale - Numeric pain rating scale  - Administer analgesics based on type and severity of pain and evaluate response  - Implement non-pharmacological measures as appropriate and evaluate response  - Consider cultural and social influences on pain and pain management  - Notify physician/advanced practitioner if interventions unsuccessful or patient reports new pain   Outcome: Progressing  Note:   Patient states his pain level has improved compared to what brought him to the ED  Patient has not taken any pain meds at this point

## 2019-10-23 NOTE — ASSESSMENT & PLAN NOTE
Lab Results   Component Value Date    HGBA1C 8 3 (H) 09/18/2019    HGBA1C 8 3 (H) 09/18/2019       No results for input(s): POCGLU in the last 72 hours      Blood Sugar Average: Last 72 hrs:  Currently NPO  Hold metformin  Accuchecks AC/HS with insulin sliding scale coverage

## 2019-10-23 NOTE — ASSESSMENT & PLAN NOTE
Diet controlled, with elevated LDL and diminished HDL  Patient was supposed to be on zetia, now agreeable to resume  Intolerant to statin  Discuss regarding diet and lifestyle modification  Outpatient follow up with PCP

## 2019-10-23 NOTE — UTILIZATION REVIEW
Initial Clinical Review    Admission: Date/Time/Statement:    Admission Orders (From admission, onward)     Ordered        10/22/19 1856  Place in Observation (expected length of stay for this patient is less than two midnights)  Once                   Orders Placed This Encounter   Procedures    Place in Observation (expected length of stay for this patient is less than two midnights)     Standing Status:   Standing     Number of Occurrences:   1     Order Specific Question:   Admitting Physician     Answer:   Kaleigh Rodriguez [[id number]]     Order Specific Question:   Level of Care     Answer:   Med Surg [[id number]]     ED Arrival Information     Expected Arrival Acuity Means of Arrival Escorted By Service Admission Type    - 10/22/2019 16:20 Urgent Walk-In Self General Medicine Urgent    Arrival Complaint    Abdominal Pain        Chief Complaint   Patient presents with    Abdominal Pain     patient c/o lower abdominal pain x 3 days  Assessment/Plan:   Patient presents for abdominal pain for the last 3 days  Some nausea but no vomiting  Initially having left lower abdominal pain intermittently for 2 days and today became constant and moves across his abdomen  No vomiting  No constipation or diarrhea  Denies painful urination  Pancreatitis  Assessment & Plan  Patient presented to the ED with complaints of epigastric to right upper quadrant pain for two days which worsened today  No precipitating or exacerbating factors  No nausea, vomiting, fevers, chills or diarrhea  Labs in the ER revealed a lipase of 1783, otherwise unremarkable  CT of the A/P with no acute findings      · Admit to medicine  · NPO  · Aggressive IV hydration  · Trend labs - CBC, lipase  · Antiemetics  · Pain medication  · Consider GI consult depending on clinical course    ED Triage Vitals [10/22/19 1634]   Temperature Pulse Respirations Blood Pressure SpO2   97 5 °F (36 4 °C) 104 18 118/68 96 %      Temp Source Heart Rate Source Patient Position - Orthostatic VS BP Location FiO2 (%)   Tympanic Monitor Sitting Right arm --      Pain Score       6        Wt Readings from Last 1 Encounters:   10/22/19 92 5 kg (203 lb 14 8 oz)     Additional Vital Signs:   10/23/19 0413  98 1 °F (36 7 °C)  65  18  106/56  97 %  None (Room air)  Lying   10/23/19 0131  98 2 °F (36 8 °C)  65  18  115/59  98 %  None (Room air)  Lying   10/22/19 2002  97 8 °F (36 6 °C)  78  17  131/71  98 %  None (Room air)  Lying   Pertinent Labs/Diagnostic Test Results:   Results from last 7 days   Lab Units 10/23/19  0602 10/22/19  1700   WBC Thousand/uL 5 63 5 83   HEMOGLOBIN g/dL 14 1 14 9   HEMATOCRIT % 41 3 43 2   PLATELETS Thousands/uL 230 254   NEUTROS ABS Thousands/µL  --  2 99     Results from last 7 days   Lab Units 10/22/19  1700   SODIUM mmol/L 141   POTASSIUM mmol/L 3 6   CHLORIDE mmol/L 106   CO2 mmol/L 25   ANION GAP mmol/L 10   BUN mg/dL 10   CREATININE mg/dL 1 12   EGFR ml/min/1 73sq m 81   CALCIUM mg/dL 8 3     Results from last 7 days   Lab Units 10/22/19  1700   AST U/L 13   ALT U/L 20   ALK PHOS U/L 115   TOTAL PROTEIN g/dL 6 8   ALBUMIN g/dL 3 7   TOTAL BILIRUBIN mg/dL 0 50     Results from last 7 days   Lab Units 10/23/19  0558 10/23/19  0129   POC GLUCOSE mg/dl 97 95     Results from last 7 days   Lab Units 10/22/19  1700   GLUCOSE RANDOM mg/dL 152*     Results from last 7 days   Lab Units 10/22/19  1700   LIPASE u/L 1,783*     Results from last 7 days   Lab Units 10/22/19  1809   CLARITY UA  Clear   COLOR UA  Light Yellow   SPEC GRAV UA  1 010   PH UA  6 0   GLUCOSE UA mg/dl Negative   KETONES UA mg/dl Negative   BLOOD UA  Negative   PROTEIN UA mg/dl Negative   NITRITE UA  Negative   BILIRUBIN UA  Negative   UROBILINOGEN UA E U /dl 1 0   LEUKOCYTES UA  Negative     CT A/P=No acute inflammatory changes within the abdomen or the pelvis    ED Treatment:   Medication Administration from 10/22/2019 1620 to 10/22/2019 2001       Date/Time Order Dose Route     10/22/2019 5117 sodium chloride 0 9 % bolus 1,000 mL 1,000 mL Intravenous     10/22/2019 1739 iohexol (OMNIPAQUE) 350 MG/ML injection (MULTI-DOSE) 100 mL 100 mL Intravenous        Past Medical History:   Diagnosis Date    BPH (benign prostatic hyperplasia)     Diabetes mellitus ([facility] [address] )     Glaucoma      Present on Admission:   Type 2 diabetes mellitus with hyperglycemia, without long-term current use of insulin (HCC)   Tobacco dependence   Hyperlipidemia   Enlarged prostate  Admitting Diagnosis: Pancreatitis [K85 90]  Abdominal pain [R10 9]  Age/Sex: 60 y o  male  Admission Orders:  MED SURG  VENDOYENS  NPO  ACCUCHECKS WITH COVERAGE SCALE  Medications:  enoxaparin 40 mg Subcutaneous Daily   insulin lispro 1-5 Units Subcutaneous Q6H [facility]   tamsulosin 0 4 mg Oral Daily With Dinner     lactated ringers 125 mL/hr Intravenous Continuous     morphine injection 2 mg Intravenous Q3H PRN   ondansetron 4 mg Intravenous Q6H PRN     Network Utilization Review Department  [email or web address]  ATTENTION: Please call with any questions or concerns to [phone] and carefully listen to the prompts so that you are directed to the right person  All voicemails are confidential   Jeremy Garcia all requests for admission clinical reviews, approved or denied determinations and any other requests to dedicated fax number below belonging to the campus where the patient is receiving treatment    FACILITY NAME UR FAX NUMBER   ADMISSION DENIALS (Administrative/Medical Necessity) [address] [facility] (Maternity/NICU/Pediatrics) [phone]   [facility] [address] [facility] [address] [address] [place] [address] [phone]   [facility] [address] [facility] [phone]   Jaymie Kruse [address] [phone]

## 2019-10-23 NOTE — ASSESSMENT & PLAN NOTE
Patient hospitalized last month for E   Coli bactermia secondary to UTI, status post treatment  Denies any dysuria hematuria  UA unremarkable  Continue flomax  Outpatient follow up with urology as planned

## 2019-10-23 NOTE — PROGRESS NOTES
[address] Internal Medicine Progress Note  Patient: Durga Perales 60 y o  male   MRN: [id number]  PCP: Marlinda Phalen, MD  Unit/Bed#: [address] [place] [address] Encounter: [id number]  Date Of Visit: 10/23/19    Problem List:    Principal Problem:    Pancreatitis  Active Problems:    Type 2 diabetes mellitus with hyperglycemia, without long-term current use of insulin ([address] )    Enlarged prostate    Tobacco dependence    Hyperlipidemia      Assessment & Plan:    * Pancreatitis  Assessment & Plan  Patient presented to the ED with complaints of epigastric to right upper quadrant pain for 2-3 days which worsened on day of presentation  Workup revealed elevated lipase of 1783  CT of the chest abdomen pelvis was unremarkable  Denies any other  or GI symptoms  Clinically better today with improvement in pain  Lipase is trending down   LFT unremarkable   CT with unremarkable pancreas and hepatobiliary tree   Denies history of alcohol use   Continue NPO, aggressive IV hydration   Monitor LFT, check triglycerides   Monitor clinically   Check ultrasound of the right upper quadrant   GI evaluation   Advanced to clear liquid diet if pain continues to improve    Type 2 diabetes mellitus with hyperglycemia, without long-term current use of insulin [facility])  Assessment & Plan  Lab Results   Component Value Date    HGBA1C 8 3 (H) 09/18/2019    HGBA1C 8 3 (H) 09/18/2019       Recent Labs     10/23/19  0129 10/23/19  0558 10/23/19  1158   POCGLU 95 97 114       Blood Sugar Average: Last 72 hrs:  (P) 102     Hold metformin  Accuchecks AC/HS with insulin sliding scale coverage    Enlarged prostate  Assessment & Plan  Patient hospitalized last month for E   Coli bactermia secondary to UTI, status post treatment  Denies any dysuria hematuria  UA unremarkable  Continue flomax  Outpatient follow up with urology    Hyperlipidemia  Assessment & Plan  Diet controlled, Check lipid profile  Outpatient follow up with PCP    Tobacco dependence  Assessment & Plan  Intermittent cigar use  Cessation reinforced        VTE Pharmacologic Prophylaxis:   Pharmacologic: Enoxaparin (Lovenox)  Mechanical VTE Prophylaxis in Place: Yes    Patient Centered Rounds: I have performed bedside rounds with nursing staff today  Discussions with Specialists or Other Care Team Provider:  Yes GI      Education and Discussions with Family / Patient:  Yes    Time Spent for Care: 45 minutes  More than 50% of total time spent on counseling and coordination of care as described above  Current Length of Stay: 0 day(s)    Current Patient Status: Observation   Certification Statement: The patient will continue to require additional inpatient hospital stay due to Acute pancreatitis requiring further observation monitoring    Discharge Plan:  Pending further workup likely next 24-48 hours    Code Status: Level 1 - Full Code      Subjective:   Presented with abdominal pain started 2-3 days ago, worsened yesterday after meal  Reported that pain was intermittent initially but yesterday he had constant worsening of pain in started from left upper quadrant radiating to right  Reports that pain has improved since yesterday now only mild  Also reports history of elevated lipase 10-12 years ago but workup at that time was unremarkable   Denies chest pain shortness of breath or dysuria or flank pain  Denies any alcohol use, no new medication except Flomax  Recently treated for UTI with antibiotic    Objective:   Not in distress  Vitals:   Temp (24hrs), Av 9 °F (36 6 °C), Min:97 5 °F (36 4 °C), Max:98 2 °F (36 8 °C)    Temp:  [97 5 °F (36 4 °C)-98 2 °F (36 8 °C)] 97 9 °F (36 6 °C)  HR:  [] 73  Resp:  [17-18] 18  BP: (106-131)/(56-81) 119/61  SpO2:  [96 %-99 %] 98 %  Body mass index is 29 68 kg/m²  Input and Output Summary (last 24 hours):        Intake/Output Summary (Last 24 hours) at 10/23/2019 1210  Last data filed at 10/23/2019 0703  Gross per 24 hour   Intake 1000 ml   Output 1075 ml Net -75 ml       Physical Exam:     Physical Exam   Constitutional: He is oriented to person, place, and time  He appears well-developed  No distress  HENT:   Head: Normocephalic and atraumatic  Eyes: Pupils are equal, round, and reactive to light  Conjunctivae are normal    Neck: Normal range of motion  Neck supple  Cardiovascular: Normal rate, regular rhythm and normal heart sounds  Pulmonary/Chest: Effort normal  No respiratory distress  He has no wheezes  He has no rhonchi  He has no rales  He exhibits no tenderness  Abdominal: Soft  Bowel sounds are normal  He exhibits no distension  There is tenderness (Mild epigastric)  There is no rebound and no guarding  Musculoskeletal: Normal range of motion  He exhibits no edema  Neurological: He is alert and oriented to person, place, and time  No cranial nerve deficit  Skin: Skin is warm and dry  No rash noted  Psychiatric: He has a normal mood and affect  Additional Data:     Labs:    Results from last 7 days   Lab Units 10/23/19  0602 10/22/19  1700   WBC Thousand/uL 5 63 5 83   HEMOGLOBIN g/dL 14 1 14 9   HEMATOCRIT % 41 3 43 2   PLATELETS Thousands/uL 230 254   NEUTROS PCT %  --  51   LYMPHS PCT %  --  39   MONOS PCT %  --  7   EOS PCT %  --  2     Results from last 7 days   Lab Units 10/23/19  0602   POTASSIUM mmol/L 3 7   CHLORIDE mmol/L 108   CO2 mmol/L 21   BUN mg/dL 6   CREATININE mg/dL 0 89   CALCIUM mg/dL 8 3   ALK PHOS U/L 80   ALT U/L 19   AST U/L 12           * I Have Reviewed All Lab Data Listed Above  * Additional Pertinent Lab Tests Reviewed:  All Labs Within Last 24 Hours Reviewed      Imaging:  Imaging Reports Reviewed Today Include:  CT scan    Recent Cultures (last 7 days):           Last 24 Hours Medication List:     Current Facility-Administered Medications:  enoxaparin 40 mg Subcutaneous Daily Jelena Dryer, CRNP    insulin lispro 1-5 Units Subcutaneous Q6H [address] Jelena Dryer, CRNP    lactated ringers 125 mL/hr Intravenous Continuous Louie Meadow, CRNP Last Rate: 125 mL/hr (10/23/19 0404)   morphine injection 2 mg Intravenous Q3H PRN Louie Meadow, CRNP    ondansetron 4 mg Intravenous Q6H PRN Louie Meadow, CRNP    tamsulosin 0 4 mg Oral Daily With [address], CRNP           Today, Patient Was Seen By: Katerina Esposito MD    ** Please Note: "This note has been constructed using a voice recognition system  Therefore there may be syntax, spelling, and/or grammatical errors   Please call if you have any questions  "**

## 2019-10-23 NOTE — PROGRESS NOTES
10/23/19 1919 [facility]    Patient Information   Mental Status Alert   Primary Caregiver Self   Support System Immediate family   Activities of Daily Living Prior to Admission   Functional Status Independent   Assistive Device No device needed   Living Arrangement House   Ambulation Independent   Means of Transportation   Means of Transport to Appts: Drive Self     LOS is 1 day under observation  Pt is independent with his own care  Has no DME or HHC  Lives with his son, as [address] and has BR accessible  Pt reports no issues with his prescription plan  No DCP needs noted per pt or CM  Per chart review and rounds with nursing and provider pt is reported to be independent with no apparent discharge needs at this time

## 2019-10-23 NOTE — CONSULTS
Consultation - [facility]  Clementina Hyde 60 y o  male MRN: [id number]  Unit/Bed#: [address] [place] [address] Encounter: [id number]        Consults    Reason for Consult / Principal Problem: Pancreatitis    HPI: Clementina Hyde is a 60y o  year old male with history of diabetes who presented to the emergency room yesterday with complaint of abdominal pain for the last 3 days, which started intermittently and became more constant, he said it started in the left upper quadrant and began to radiate more to the right lower quadrant as time went on  He denies any family history of pancreatic cancer, he was told about mildly elevated lipase levels in the past but was not actually diagnosed with pancreatitis before  His lipase was found to be elevated over 1400 here, he was started on IV fluids and is on a clear liquid diet at this time  He says he started Flomax about 3-4 weeks ago but otherwise has not started any new medications  He denies any regular or recent alcohol use  Denies any rectal bleeding or melena  REVIEW OF SYSTEMS:    CONSTITUTIONAL: Denies any fever, chills, or rigors  Good appetite, and no recent weight loss  HEENT: No earache or tinnitus  Denies hearing loss or visual disturbances  CARDIOVASCULAR: No chest pain or palpitations  RESPIRATORY: Denies any cough, hemoptysis, shortness of breath or dyspnea on exertion  GASTROINTESTINAL: As noted in the History of Present Illness  GENITOURINARY: No problems with urination  Denies any hematuria or dysuria  NEUROLOGIC: No dizziness or vertigo, denies headaches  MUSCULOSKELETAL: Denies any muscle or joint pain  SKIN: Denies skin rashes or itching  ENDOCRINE: Denies excessive thirst  Denies intolerance to heat or cold  PSYCHOSOCIAL: Denies depression or anxiety  Denies any recent memory loss         Historical Information   Past Medical History:   Diagnosis Date    BPH (benign prostatic hyperplasia)     [facility]     Glaucoma      Past Surgical History:   Procedure Laterality Date    EYE SURGERY       Social History   Social History     Substance and Sexual Activity   Alcohol Use Never    Frequency: Never     Social History     Substance and Sexual Activity   Drug Use Never     Social History     Tobacco Use   Smoking Status Current Some Day Smoker   Smokeless Tobacco Never Used   Tobacco Comment    rarely cigar     Family History   Problem Relation Age of Onset    Diabetes Mother     Heart attack Mother     No Known Problems Brother     No Known Problems Brother     No Known Problems Brother        Meds/Allergies     Medications Prior to Admission   Medication    metFORMIN (GLUCOPHAGE) 500 mg tablet    tamsulosin (FLOMAX) 0 4 mg     Current Facility-Administered Medications   Medication Dose Route Frequency    enoxaparin (LOVENOX) subcutaneous injection 40 mg  40 mg Subcutaneous Daily    insulin lispro (HumaLOG) 100 units/mL subcutaneous injection 1-5 Units  1-5 Units Subcutaneous Q6H [address]    lactated ringers infusion  125 mL/hr Intravenous Continuous    morphine injection 2 mg  2 mg Intravenous Q3H PRN    ondansetron (ZOFRAN) injection 4 mg  4 mg Intravenous Q6H PRN    tamsulosin (FLOMAX) capsule 0 4 mg  0 4 mg Oral Daily With Dinner       Allergies   Allergen Reactions    Cat Hair Extract      Reaction Date: 03Dec2008;     Dust Mite Extract      Reaction Date: 03Dec2008;    Lyndee Comp Pollen Allergen      Reaction Date: 03Dec2008;     Tree Extract      Reaction Date: 03Dec2008; Objective     Blood pressure 119/61, pulse 73, temperature 97 9 °F (36 6 °C), temperature source Oral, resp  rate 18, height 5' 9 5" (1 765 m), weight 92 5 kg (203 lb 14 8 oz), SpO2 98 %        Intake/Output Summary (Last 24 hours) at 10/23/2019 1403  Last data filed at 10/23/2019 1352  Gross per 24 hour   Intake 2000 ml   Output 1075 ml   Net 925 ml         PHYSICAL EXAM     General Appearance:   Alert, cooperative, no distress, appears stated age    HEENT:   Normocephalic, atraumatic, anicteric      Neck:  Supple, symmetrical, trachea midline, no adenopathy;    thyroid: no enlargement/tenderness/nodules; no carotid  bruit or JVD    Lungs:   Clear to auscultation bilaterally; no rales, rhonchi or wheezing; respirations unlabored    Heart[de-identified]   S1 and S2 normal; regular rate and rhythm; no murmur, rub, or gallop     Abdomen:   Soft, non-tender, non-distended; normal bowel sounds; no masses, no organomegaly    Genitalia:   Deferred    Rectal:   Deferred    Extremities:  No cyanosis, clubbing or edema    Pulses:  2+ and symmetric all extremities    Skin:  Skin color, texture, turgor normal, no rashes or lesions    Lymph nodes:  No palpable cervical, axillary or inguinal lymphadenopathy        Lab Results:   Admission on 10/22/2019   Component Date Value    WBC 10/22/2019 5 83     RBC 10/22/2019 4 97     Hemoglobin 10/22/2019 14 9     Hematocrit 10/22/2019 43 2     MCV 10/22/2019 87     MCH 10/22/2019 30 0     MCHC 10/22/2019 34 5     RDW 10/22/2019 12 6     MPV 10/22/2019 9 0     Platelets 18/18/8450 254     nRBC 10/22/2019 0     Neutrophils Relative 10/22/2019 51     Immat GRANS % 10/22/2019 0     Lymphocytes Relative 10/22/2019 39     Monocytes Relative 10/22/2019 7     Eosinophils Relative 10/22/2019 2     Basophils Relative 10/22/2019 1     Neutrophils Absolute 10/22/2019 2 99     Immature Grans Absolute 10/22/2019 0 02     Lymphocytes Absolute 10/22/2019 2 26     Monocytes Absolute 10/22/2019 0 40     Eosinophils Absolute 10/22/2019 0 10     Basophils Absolute 10/22/2019 0 06     Sodium 10/22/2019 141     Potassium 10/22/2019 3 6     Chloride 10/22/2019 106     CO2 10/22/2019 25     ANION GAP 10/22/2019 10     BUN 10/22/2019 10     Creatinine 10/22/2019 1 12     Glucose 10/22/2019 152*    Calcium 10/22/2019 8 3     AST 10/22/2019 13     ALT 10/22/2019 20     Alkaline Phosphatase 10/22/2019 115  Total Protein 10/22/2019 6 8     Albumin 10/22/2019 3 7     Total Bilirubin 10/22/2019 0 50     eGFR 10/22/2019 81     Lipase 10/22/2019 1,783*    Color, UA 10/22/2019 Light Yellow     Clarity, UA 10/22/2019 Clear     Specific Gravity, UA 10/22/2019 1 010     pH, UA 10/22/2019 6 0     Leukocytes, UA 10/22/2019 Negative     Nitrite, UA 10/22/2019 Negative     Protein, UA 10/22/2019 Negative     Glucose, UA 10/22/2019 Negative     Ketones, UA 10/22/2019 Negative     Urobilinogen, UA 10/22/2019 1 0     Bilirubin, UA 10/22/2019 Negative     Blood, UA 10/22/2019 Negative     POC Glucose 10/23/2019 95     Sodium 10/23/2019 139     Potassium 10/23/2019 3 7     Chloride 10/23/2019 108     CO2 10/23/2019 21     ANION GAP 10/23/2019 10     BUN 10/23/2019 6     Creatinine 10/23/2019 0 89     Glucose 10/23/2019 95     Calcium 10/23/2019 8 3     AST 10/23/2019 12     ALT 10/23/2019 19     Alkaline Phosphatase 10/23/2019 80     Total Protein 10/23/2019 5 9*    Albumin 10/23/2019 3 2*    Total Bilirubin 10/23/2019 0 70     eGFR 10/23/2019 106     Lipase 10/23/2019 451*    WBC 10/23/2019 5 63     RBC 10/23/2019 4 71     Hemoglobin 10/23/2019 14 1     Hematocrit 10/23/2019 41 3     MCV 10/23/2019 88     MCH 10/23/2019 29 9     MCHC 10/23/2019 34 1     RDW 10/23/2019 12 4     Platelets 44/86/2608 230     MPV 10/23/2019 9 2     POC Glucose 10/23/2019 97     POC Glucose 10/23/2019 114        Imaging Studies: I have personally reviewed pertinent reports  CT ABDOMEN AND PELVIS WITH IV CONTRAST     INDICATION:   LLQ pain      COMPARISON:  CT renal stone 9/18/2019     TECHNIQUE:  CT examination of the abdomen and pelvis was performed  Axial, sagittal, and coronal 2D reformatted images were created from the source data and submitted for interpretation      Radiation dose length product (DLP) for this visit:  688 mGy-cm     This examination, like all CT scans performed in the [facility], was performed utilizing techniques to minimize radiation dose exposure, including the use of iterative   reconstruction and automated exposure control      IV Contrast:  100 mL of iohexol (OMNIPAQUE)  Enteric Contrast:  Enteric contrast was not administered      FINDINGS:     ABDOMEN     LOWER CHEST:  No clinically significant abnormality identified in the visualized lower chest      LIVER/BILIARY TREE:  Unremarkable      GALLBLADDER:  No calcified gallstones  No pericholecystic inflammatory change      SPLEEN:  Unremarkable      PANCREAS:  Unremarkable      ADRENAL GLANDS:  Unremarkable      KIDNEYS/URETERS:  No hydronephrosis  Nonobstructive 6 mm lower pole right renal calculus  Simple appearing lower pole right renal subcentimeter cyst      STOMACH AND BOWEL:  Unremarkable      APPENDIX:  No findings to suggest appendicitis      ABDOMINOPELVIC CAVITY:  No ascites or free intraperitoneal air  No lymphadenopathy      VESSELS:  Unremarkable for patient's age      PELVIS     REPRODUCTIVE ORGANS:  Prostamegaly      URINARY BLADDER:  Unremarkable      ABDOMINAL WALL/INGUINAL REGIONS:  Uncomplicated fat-containing inguinal hernias  Uncomplicated containing paraumbilical hernia measuring 29 mm in greatest diameter     OSSEOUS STRUCTURES:  No acute fracture or destructive osseous lesion      IMPRESSION:     No acute inflammatory changes within the abdomen or the pelvis           ASSESSMENT/PLAN:     1   Acute pancreatitis of unclear etiology, patient denies any significant alcohol use and his liver enzymes appear normal   No clinical signs of complication at this time    - monitor abdominal exam, temperature, white blood cell count, consider repeating imaging if any concerning findings  - will check right upper quadrant ultrasound, assess for gallbladder stones or sludge, any evidence of choledocholithiasis  - check a lipid profile with attention to triglycerides  - consider MRI of the pancreas with MRCP if no obvious etiology is elicited with the above workup  - continue IV fluids and clear liquid diet, gradually advance diet as patient tolerates  - I discussed patient's case and plan [facility]    The patient was seen and examined by Dr Joel Ontiveros, all quiles medical decisions were made with Dr Joel Ontiveros  Thank you for allowing us to participate in the care of this pleasant patient  We will follow up with you closely

## 2019-10-23 NOTE — ASSESSMENT & PLAN NOTE
Patient presented to the ED with complaints of epigastric to right upper quadrant pain for 2-3 days which worsened on day of presentation  Workup revealed elevated lipase of 1783  CT of the chest abdomen pelvis was unremarkable     Denied any other  or GI symptoms  Reports prior history of elevated lipase many years ago   LFT unremarkable, triglyceride acceptable   CT with unremarkable pancreas and hepatobiliary tree   Denies history of alcohol use   Ultrasound revealed hepatic steatosis and small amount of sludge normal pancreas  Likely biliary secondary to passed sludge  Treated with IV hydration NPO  Lipase has normalized  Diet was gradually advanced which patient tolerated  Patient remains pain free and tolerating low-fat diet without any limiting symptoms  Discuss regarding findings  Low-fat diet was advised  Advised surgical evaluation, patient prefers to follow up as outpatient  Seen in GI during hospitalization, input appreciated

## 2019-10-24 VITALS
WEIGHT: 203.93 LBS | DIASTOLIC BLOOD PRESSURE: 63 MMHG | RESPIRATION RATE: 18 BRPM | HEART RATE: 68 BPM | HEIGHT: 70 IN | OXYGEN SATURATION: 98 % | TEMPERATURE: 97.8 F | SYSTOLIC BLOOD PRESSURE: 113 MMHG | BODY MASS INDEX: 29.19 KG/M2

## 2019-10-24 PROBLEM — K85.90 PANCREATITIS: Status: RESOLVED | Noted: 2019-10-22 | Resolved: 2019-10-24

## 2019-10-24 PROBLEM — K83.8 BILIARY SLUDGE: Status: ACTIVE | Noted: 2019-10-24

## 2019-10-24 LAB
ALBUMIN SERPL BCP-MCNC: 3.3 G/DL (ref 3.5–5)
ALP SERPL-CCNC: 72 U/L (ref 46–116)
ALT SERPL W P-5'-P-CCNC: 19 U/L (ref 12–78)
ANION GAP SERPL CALCULATED.3IONS-SCNC: 9 MMOL/L (ref 4–13)
AST SERPL W P-5'-P-CCNC: 14 U/L (ref 5–45)
BASOPHILS # BLD AUTO: 0.04 THOUSANDS/ΜL (ref 0–0.1)
BASOPHILS NFR BLD AUTO: 1 % (ref 0–1)
BILIRUB DIRECT SERPL-MCNC: 0.2 MG/DL (ref 0–0.2)
BILIRUB SERPL-MCNC: 0.8 MG/DL (ref 0.2–1)
BUN SERPL-MCNC: 7 MG/DL (ref 5–25)
CALCIUM SERPL-MCNC: 8.3 MG/DL (ref 8.3–10.1)
CHLORIDE SERPL-SCNC: 106 MMOL/L (ref 100–108)
CHOLEST SERPL-MCNC: 202 MG/DL (ref 50–200)
CO2 SERPL-SCNC: 24 MMOL/L (ref 21–32)
CREAT SERPL-MCNC: 0.92 MG/DL (ref 0.6–1.3)
EOSINOPHIL # BLD AUTO: 0.1 THOUSAND/ΜL (ref 0–0.61)
EOSINOPHIL NFR BLD AUTO: 2 % (ref 0–6)
ERYTHROCYTE [DISTWIDTH] IN BLOOD BY AUTOMATED COUNT: 12.3 % (ref 11.6–15.1)
GFR SERPL CREATININE-BSD FRML MDRD: 103 ML/MIN/1.73SQ M
GLUCOSE P FAST SERPL-MCNC: 126 MG/DL (ref 65–99)
GLUCOSE SERPL-MCNC: 126 MG/DL (ref 65–140)
GLUCOSE SERPL-MCNC: 133 MG/DL (ref 65–140)
GLUCOSE SERPL-MCNC: 184 MG/DL (ref 65–140)
HCT VFR BLD AUTO: 43.2 % (ref 36.5–49.3)
HDLC SERPL-MCNC: 31 MG/DL
HGB BLD-MCNC: 14.6 G/DL (ref 12–17)
IMM GRANULOCYTES # BLD AUTO: 0.02 THOUSAND/UL (ref 0–0.2)
IMM GRANULOCYTES NFR BLD AUTO: 0 % (ref 0–2)
INR PPP: 1.04 (ref 0.91–1.09)
LDLC SERPL CALC-MCNC: 139 MG/DL (ref 0–100)
LIPASE SERPL-CCNC: 255 U/L (ref 73–393)
LYMPHOCYTES # BLD AUTO: 1.91 THOUSANDS/ΜL (ref 0.6–4.47)
LYMPHOCYTES NFR BLD AUTO: 36 % (ref 14–44)
MCH RBC QN AUTO: 29.7 PG (ref 26.8–34.3)
MCHC RBC AUTO-ENTMCNC: 33.8 G/DL (ref 31.4–37.4)
MCV RBC AUTO: 88 FL (ref 82–98)
MONOCYTES # BLD AUTO: 0.35 THOUSAND/ΜL (ref 0.17–1.22)
MONOCYTES NFR BLD AUTO: 7 % (ref 4–12)
MRSA NOSE QL CULT: NORMAL
NEUTROPHILS # BLD AUTO: 2.91 THOUSANDS/ΜL (ref 1.85–7.62)
NEUTS SEG NFR BLD AUTO: 54 % (ref 43–75)
NONHDLC SERPL-MCNC: 171 MG/DL
NRBC BLD AUTO-RTO: 0 /100 WBCS
PLATELET # BLD AUTO: 257 THOUSANDS/UL (ref 149–390)
PMV BLD AUTO: 9.3 FL (ref 8.9–12.7)
POTASSIUM SERPL-SCNC: 3.8 MMOL/L (ref 3.5–5.3)
PROT SERPL-MCNC: 6 G/DL (ref 6.4–8.2)
PROTHROMBIN TIME: 11.2 SECONDS (ref 9.8–12)
RBC # BLD AUTO: 4.91 MILLION/UL (ref 3.88–5.62)
SODIUM SERPL-SCNC: 139 MMOL/L (ref 136–145)
TRIGL SERPL-MCNC: 162 MG/DL
WBC # BLD AUTO: 5.33 THOUSAND/UL (ref 4.31–10.16)

## 2019-10-24 PROCEDURE — 85025 COMPLETE CBC W/AUTO DIFF WBC: CPT | Performed by: INTERNAL MEDICINE

## 2019-10-24 PROCEDURE — 85610 PROTHROMBIN TIME: CPT | Performed by: PHYSICIAN ASSISTANT

## 2019-10-24 PROCEDURE — 83690 ASSAY OF LIPASE: CPT | Performed by: INTERNAL MEDICINE

## 2019-10-24 PROCEDURE — 80061 LIPID PANEL: CPT | Performed by: PHYSICIAN ASSISTANT

## 2019-10-24 PROCEDURE — 82948 REAGENT STRIP/BLOOD GLUCOSE: CPT

## 2019-10-24 PROCEDURE — 99217 PR OBSERVATION CARE DISCHARGE MANAGEMENT: CPT | Performed by: INTERNAL MEDICINE

## 2019-10-24 PROCEDURE — 80048 BASIC METABOLIC PNL TOTAL CA: CPT | Performed by: INTERNAL MEDICINE

## 2019-10-24 PROCEDURE — 99213 OFFICE O/P EST LOW 20 MIN: CPT | Performed by: PHYSICIAN ASSISTANT

## 2019-10-24 PROCEDURE — 80076 HEPATIC FUNCTION PANEL: CPT | Performed by: PHYSICIAN ASSISTANT

## 2019-10-24 RX ORDER — EZETIMIBE 10 MG/1
10 TABLET ORAL DAILY
Refills: 0
Start: 2019-10-24

## 2019-10-24 NOTE — PROGRESS NOTES
Progress Note - Davey Spaulding 60 y o  male MRN: [id number]    Unit/Bed#: [address] [place] [address] Encounter: [id number]        Subjective:   Patient reports feeling significantly better today, he is tolerating breakfast of regular foods and denies any abdominal pain at this time  No nausea or vomiting, no fevers or chills  Objective:     Vitals: Blood pressure 113/63, pulse 68, temperature 97 8 °F (36 6 °C), temperature source Oral, resp  rate 18, height 5' 9 5" (1 765 m), weight 92 5 kg (203 lb 14 8 oz), SpO2 98 %  ,Body mass index is 29 68 kg/m²  Intake/Output Summary (Last 24 hours) at 10/24/2019 0853  Last data filed at 10/24/2019 0510  Gross per 24 hour   Intake 3952 5 ml   Output 1300 ml   Net 2652 5 ml       Physical Exam:   General appearance: alert, appears stated age and cooperative  Lungs: clear to auscultation bilaterally, no labored breathing/accessory muscle use  Heart: regular rate and rhythm, S1, S2 normal, no murmur, click, rub or gallop  Abdomen: soft, non-tender; bowel sounds normal; no masses,  no organomegaly  Extremities: no edema    Invasive Devices     Peripheral Intravenous Line            Peripheral IV 10/22/19 Left Forearm 1 day                Lab, Imaging and other studies: I have personally reviewed pertinent reports      Admission on 10/22/2019   Component Date Value    WBC 10/22/2019 5 83     RBC 10/22/2019 4 97     Hemoglobin 10/22/2019 14 9     Hematocrit 10/22/2019 43 2     MCV 10/22/2019 87     MCH 10/22/2019 30 0     MCHC 10/22/2019 34 5     RDW 10/22/2019 12 6     MPV 10/22/2019 9 0     Platelets 39/52/0165 254     nRBC 10/22/2019 0     Neutrophils Relative 10/22/2019 51     Immat GRANS % 10/22/2019 0     Lymphocytes Relative 10/22/2019 39     Monocytes Relative 10/22/2019 7     Eosinophils Relative 10/22/2019 2     Basophils Relative 10/22/2019 1     Neutrophils Absolute 10/22/2019 2 99     Immature Grans Absolute 10/22/2019 0 02     Lymphocytes Absolute 10/22/2019 2 26     Monocytes Absolute 10/22/2019 0 40     Eosinophils Absolute 10/22/2019 0 10     Basophils Absolute 10/22/2019 0 06     Sodium 10/22/2019 141     Potassium 10/22/2019 3 6     Chloride 10/22/2019 106     CO2 10/22/2019 25     ANION GAP 10/22/2019 10     BUN 10/22/2019 10     Creatinine 10/22/2019 1 12     Glucose 10/22/2019 152*    Calcium 10/22/2019 8 3     AST 10/22/2019 13     ALT 10/22/2019 20     Alkaline Phosphatase 10/22/2019 115     Total Protein 10/22/2019 6 8     Albumin 10/22/2019 3 7     Total Bilirubin 10/22/2019 0 50     eGFR 10/22/2019 81     Lipase 10/22/2019 1,783*    Color, UA 10/22/2019 Light Yellow     Clarity, UA 10/22/2019 Clear     Specific Gravity, UA 10/22/2019 1 010     pH, UA 10/22/2019 6 0     Leukocytes, UA 10/22/2019 Negative     Nitrite, UA 10/22/2019 Negative     Protein, UA 10/22/2019 Negative     Glucose, UA 10/22/2019 Negative     Ketones, UA 10/22/2019 Negative     Urobilinogen, UA 10/22/2019 1 0     Bilirubin, UA 10/22/2019 Negative     Blood, UA 10/22/2019 Negative     POC Glucose 10/23/2019 95     Sodium 10/23/2019 139     Potassium 10/23/2019 3 7     Chloride 10/23/2019 108     CO2 10/23/2019 21     ANION GAP 10/23/2019 10     BUN 10/23/2019 6     Creatinine 10/23/2019 0 89     Glucose 10/23/2019 95     Calcium 10/23/2019 8 3     AST 10/23/2019 12     ALT 10/23/2019 19     Alkaline Phosphatase 10/23/2019 80     Total Protein 10/23/2019 5 9*    Albumin 10/23/2019 3 2*    Total Bilirubin 10/23/2019 0 70     eGFR 10/23/2019 106     Lipase 10/23/2019 451*    WBC 10/23/2019 5 63     RBC 10/23/2019 4 71     Hemoglobin 10/23/2019 14 1     Hematocrit 10/23/2019 41 3     MCV 10/23/2019 88     MCH 10/23/2019 29 9     MCHC 10/23/2019 34 1     RDW 10/23/2019 12 4     Platelets 98/92/0642 230     MPV 10/23/2019 9 2     POC Glucose 10/23/2019 97     POC Glucose 10/23/2019 114     POC Glucose 10/23/2019 89     POC Glucose 10/23/2019 145*    Total Bilirubin 10/24/2019 0 80     Bilirubin, Direct 10/24/2019 0 20     Alkaline Phosphatase 10/24/2019 72     AST 10/24/2019 14     ALT 10/24/2019 19     Total Protein 10/24/2019 6 0*    Albumin 10/24/2019 3 3*    Protime 10/24/2019 11 2     INR 10/24/2019 1 04     Cholesterol 10/24/2019 202*    Triglycerides 10/24/2019 162*    HDL, Direct 10/24/2019 31*    LDL Calculated 10/24/2019 139*    Non-HDL-Chol (CHOL-HDL) 10/24/2019 171     WBC 10/24/2019 5 33     RBC 10/24/2019 4 91     Hemoglobin 10/24/2019 14 6     Hematocrit 10/24/2019 43 2     MCV 10/24/2019 88     MCH 10/24/2019 29 7     MCHC 10/24/2019 33 8     RDW 10/24/2019 12 3     MPV 10/24/2019 9 3     Platelets 98/10/8524 257     nRBC 10/24/2019 0     Neutrophils Relative 10/24/2019 54     Immat GRANS % 10/24/2019 0     Lymphocytes Relative 10/24/2019 36     Monocytes Relative 10/24/2019 7     Eosinophils Relative 10/24/2019 2     Basophils Relative 10/24/2019 1     Neutrophils Absolute 10/24/2019 2 91     Immature Grans Absolute 10/24/2019 0 02     Lymphocytes Absolute 10/24/2019 1 91     Monocytes Absolute 10/24/2019 0 35     Eosinophils Absolute 10/24/2019 0 10     Basophils Absolute 10/24/2019 0 04     Sodium 10/24/2019 139     Potassium 10/24/2019 3 8     Chloride 10/24/2019 106     CO2 10/24/2019 24     ANION GAP 10/24/2019 9     BUN 10/24/2019 7     Creatinine 10/24/2019 0 92     Glucose 10/24/2019 126     Glucose, Fasting 10/24/2019 126*    Calcium 10/24/2019 8 3     eGFR 10/24/2019 103     Lipase 10/24/2019 255     POC Glucose 10/24/2019 133      Results for Gloria Hobbs (MRN [id number]) as of 10/24/2019 08:53   Ref   Range 9/20/2019 11:26 9/20/2019 15:55 9/20/2019 21:04 9/21/2019 05:45 9/21/2019 07:11 9/21/2019 11:17 10/22/2019 17:00 10/22/2019 18:09 10/22/2019 21:09 10/23/2019 01:29 10/23/2019 05:58 10/23/2019 06:02 10/23/2019 11:58 10/23/2019 16:16 10/23/2019 20:12 10/24/2019 04:37 10/24/2019 07:11   POC Glucose Latest Ref Range: 65 - 140 mg/dl 314 (H) 214 (H) 315 (H)  251 (H) 291 (H)    95 97  114 89 145 (H)  133   Sodium Latest Ref Range: 136 - 145 mmol/L    134 (L)   141     139    139    Potassium Latest Ref Range: 3 5 - 5 3 mmol/L    3 7   3 6     3 7    3 8    Chloride Latest Ref Range: 100 - 108 mmol/L    101   106     108    106    CO2 Latest Ref Range: 21 - 32 mmol/L    23   25     21    24    Anion Gap Latest Ref Range: 4 - 13 mmol/L    10   10     10    9    BUN Latest Ref Range: 5 - 25 mg/dL    7   10     6    7    Creatinine Latest Ref Range: 0 60 - 1 30 mg/dL    0 94   1 12     0 89    0 92    Glucose, Random Latest Ref Range: 65 - 140 mg/dL    262 (H)   152 (H)     95    126    GLUCOSE FASTING Latest Ref Range: 65 - 99 mg/dL                126 (H)    Calcium Latest Ref Range: 8 3 - 10 1 mg/dL    8 4   8 3     8 3    8 3    AST Latest Ref Range: 5 - 45 U/L       13     12    14    ALT Latest Ref Range: 12 - 78 U/L       20     19    19    Alkaline Phosphatase Latest Ref Range: 46 - 116 U/L       115     80    72    Total Protein Latest Ref Range: 6 4 - 8 2 g/dL       6 8     5 9 (L)    6 0 (L)    Albumin Latest Ref Range: 3 5 - 5 0 g/dL       3 7     3 2 (L)    3 3 (L)    TOTAL BILIRUBIN Latest Ref Range: 0 20 - 1 00 mg/dL       0 50     0 70    0 80    eGFR Latest Units: ml/min/1 73sq m    100   81     106    103    Lipase Latest Ref Range: 73 - 393 u/L       1,783 (H)     451 (H)    255    BILIRUBIN DIRECT Latest Ref Range: 0 00 - 0 20 mg/dL                0 20    Cholesterol Latest Ref Range: 50 - 200 mg/dL                202 (H)    Triglycerides Latest Ref Range: <=150 mg/dL                162 (H)    HDL Latest Ref Range: >=40 mg/dL                31 (L)    Non-HDL Cholesterol Latest Units: mg/dl                171    LDL Direct Latest Ref Range: 0 - 100 mg/dL                139 (H)    WBC Latest Ref Range: 4 31 - 10 16 Thousand/uL    4 72   5 83     5 63    5 33    Red Blood Cell Count Latest Ref Range: 3 88 - 5 62 Million/uL    4 83   4 97     4 71    4 91    Hemoglobin Latest Ref Range: 12 0 - 17 0 g/dL    14 3   14 9     14 1    14 6    HCT Latest Ref Range: 36 5 - 49 3 %    41 3   43 2     41 3    43 2    MCV Latest Ref Range: 82 - 98 fL    86   87     88    88    MCH Latest Ref Range: 26 8 - 34 3 pg    29 6   30 0     29 9    29 7    MCHC Latest Ref Range: 31 4 - 37 4 g/dL    34 6   34 5     34 1    33 8    RDW Latest Ref Range: 11 6 - 15 1 %    12 0   12 6     12 4    12 3    Platelet Count Latest Ref Range: 149 - 390 Thousands/uL    239   254     230    257    MPV Latest Ref Range: 8 9 - 12 7 fL    9 5   9 0     9 2    9 3    nRBC Latest Units: /100 WBCs    0   0         0    Neutrophils % Latest Ref Range: 43 - 75 %    61   51         54    Immat GRANS % Latest Ref Range: 0 - 2 %    0   0         0    Lymphocytes Relative Latest Ref Range: 14 - 44 %    27   39         36    Monocytes Relative Latest Ref Range: 4 - 12 %    8   7         7    Eosinophils Latest Ref Range: 0 - 6 %    3   2         2    Basophils Relative Latest Ref Range: 0 - 1 %    1   1         1    Immature Grans Absolute Latest Ref Range: 0 00 - 0 20 Thousand/uL    0 02   0 02         0 02    Absolute Neutrophils Latest Ref Range: 1 85 - 7 62 Thousands/µL    2 91   2 99         2 91    Lymphocytes Absolute Latest Ref Range: 0 60 - 4 47 Thousands/µL    1 26   2 26         1 91    Absolute Monocytes Latest Ref Range: 0 17 - 1 22 Thousand/µL    0 38   0 40         0 35    Absolute Eosinophils Latest Ref Range: 0 00 - 0 61 Thousand/µL    0 12   0 10         0 10    Basophils Absolute Latest Ref Range: 0 00 - 0 10 Thousands/µL    0 03   0 06         0 04    Protime Latest Ref Range: 9 8 - 12 0 seconds                11 2    INR Latest Ref Range: 0 91 - 1 09                 1 04        Assessment/Plan:    1  Acute pancreatitis, appears clinically improving at this time, and no evidence of complication    Most likely secondary to gallbladder sludge, he did have gallbladder sludge demonstrated on his ultrasound although there is no evidence of CBD obstruction at this time, his liver enzymes have been normal and his CBD measures 3 mm    - may continue with IV fluids, low-fat diet  - triglycerides were noted to be normal at 162  - consider surgical consultation regarding possibility of cholecystectomy given patient's recurrent episodes of pancreatitis  - continue avoidance of alcohol, although patient denies any significant alcohol use in general  - also advised patient about tobacco cessation  - I discussed this patient's case and plan with Dr [facility]

## 2019-10-24 NOTE — PLAN OF CARE
Problem: PAIN - ADULT  Goal: Verbalizes/displays adequate comfort level or baseline comfort level  Description  Interventions:  - Encourage patient to monitor pain and request assistance  - Assess pain using appropriate pain scale - Numeric pain rating scale  - Administer analgesics based on type and severity of pain and evaluate response  - Implement non-pharmacological measures as appropriate and evaluate response  - Consider cultural and social influences on pain and pain management  - Notify physician/advanced practitioner if interventions unsuccessful or patient reports new pain   Outcome: Progressing

## 2019-10-24 NOTE — DISCHARGE INSTRUCTIONS
Ezetimibe (By mouth)   Ezetimibe ([id number])  Lowers high cholesterol levels  Brand Name(s): Dorie   There may be other brand names for this medicine  When This Medicine Should Not Be Used: This medicine is not right for everyone  Do not use it if you had an allergic reaction to ezetimibe  How to Use This Medicine:   Tablet  · Take your medicine as directed  Your dose may need to be changed several times to find what works best for you  · If you also use cholestyramine, take it at least 2 hours after or 4 hours before you take ezetimibe  · Missed dose: Take a dose as soon as you remember  If it is almost time for your next dose, wait until then and take a regular dose  Do not take extra medicine to make up for a missed dose  · Store the medicine in a closed container at room temperature, away from heat, moisture, and direct light  Drugs and Foods to Avoid:   Ask your doctor or pharmacist before using any other medicine, including over-the-counter medicines, vitamins, and herbal products  · Do not use this medicine together with a statin medicine if you are pregnant or breastfeeding, or if you have liver disease  · Some medicines can affect how ezetimibe works  Tell your doctor if you are using any of the following:   ¨ Cyclosporine  ¨ Cholestyramine  ¨ Fenofibrate  ¨ Gemfibrozil  ¨ A blood thinner, such as warfarin  Warnings While Using This Medicine:   · Tell your doctor if you are pregnant or breastfeeding, or if you have liver disease, kidney disease, or thyroid problems  · This medicine may cause myopathy or rhabdomyolysis, which are serious muscle problems  · Your doctor will check your progress and the effects of this medicine at regular visits  Keep all appointments  · Keep all medicine out of the reach of children  Never share your medicine with anyone    Possible Side Effects While Using This Medicine:   Call your doctor right away if you notice any of these side effects:  · Allergic reaction: Itching or hives, swelling in your face or hands, swelling or tingling in your mouth or throat, chest tightness, trouble breathing  · Muscle pain, tenderness, or weakness  · Nausea, vomiting, loss of appetite, stomach pain, yellow skin or eyes  If you notice other side effects that you think are caused by this medicine, tell your doctor  Call your doctor for medical advice about side effects  You may report side effects to FDA at 4-695-FDA-7240  © 2017 [address] [employer] Information is for End User's use only and may not be sold, redistributed or otherwise used for commercial purposes  The above information is an  only  It is not intended as medical advice for individual conditions or treatments  Talk to your doctor, nurse or pharmacist before following any medical regimen to see if it is safe and effective for you

## 2019-10-24 NOTE — NURSING NOTE
Discharge instructions and med recon reviewed with pt  No questions asked  No prescriptions written and left in the chart  Pt stated he already had a prescription for Zetia at home  Vaccines were up to date  Masimo and IV access previously removed  No further needs  Pt walked to the discharge area by Sharla Harmon

## 2019-10-24 NOTE — DISCHARGE SUMMARY
Discharge Summary - [facility] Internal Medicine    Patient Information: Felipe Vickers 60 y o  male MRN: [id number]  Unit/Bed#: [address] [place] [address] Encounter: [id number]    Discharging Physician / Practitioner: Dania Lazar MD  PCP: Nicholas Elliott MD  Admission Date: 10/22/2019  Discharge Date: 10/24/19    Reason for Admission: Abdominal Pain (patient c/o lower abdominal pain x 3 days )      Discharge Diagnoses:     Principal Problem:    Pancreatitis  Active Problems:    Type 2 diabetes mellitus with hyperglycemia, without long-term current use of insulin ([address] )    Enlarged prostate    Tobacco dependence    Hyperlipidemia  Resolved Problems:    * No resolved hospital problems  *        * Pancreatitis  Assessment & Plan  Patient presented to the ED with complaints of epigastric to right upper quadrant pain for 2-3 days which worsened on day of presentation  Workup revealed elevated lipase of 1783  CT of the chest abdomen pelvis was unremarkable     Denied any other  or GI symptoms  Reports prior history of elevated lipase many years ago   LFT unremarkable, triglyceride acceptable   CT with unremarkable pancreas and hepatobiliary tree   Denies history of alcohol use   Ultrasound revealed hepatic steatosis and small amount of sludge normal pancreas  Likely biliary secondary to passed sludge  Treated with IV hydration NPO  Lipase has normalized  Diet was gradually advanced which patient tolerated  Patient remains pain free and tolerating low-fat diet without any limiting symptoms  Discuss regarding findings  Low-fat diet was advised  Advised surgical evaluation, patient prefers to follow up as outpatient  Seen in GI during hospitalization, input appreciated      Type 2 diabetes mellitus with hyperglycemia, without long-term current use of insulin [facility])  Assessment & Plan  Lab Results   Component Value Date    HGBA1C 8 3 (H) 09/18/2019    HGBA1C 8 3 (H) 09/18/2019       Recent Labs     10/23/19  1616 10/23/19  2012 10/24/19  0711 10/24/19  1059   POCGLU 89 145* 133 184*       Blood Sugar Average: Last 72 hrs:  (P) 122 [id number]     Resume metformin at the time of discharge  Emphasized diet and lifestyle modification    Enlarged prostate  Assessment & Plan  Patient hospitalized last month for E  Coli bactermia secondary to UTI, status post treatment  Denies any dysuria hematuria  UA unremarkable  Continue flomax  Outpatient follow up with urology as planned    Hyperlipidemia  Assessment & Plan  Diet controlled, with elevated LDL and diminished HDL  Patient was supposed to be on zetia, now agreeable to resume  Intolerant to statin  Discuss regarding diet and lifestyle modification  Outpatient follow up with PCP    Tobacco dependence  Assessment & Plan  Intermittent cigar use  Cessation reinforced      Consultations During Hospital Stay:  IP CONSULT TO GASTROENTEROLOGY    Procedures Performed:     None  Significant Findings:     · Refer to hospital course and above listed diagnosis related plan for details    Imaging while in hospital:    Us Right Upper Quadrant    Result Date: 10/23/2019  Narrative: RIGHT UPPER QUADRANT ULTRASOUND INDICATION:     pancreatitis  COMPARISON:  CT of the abdomen and pelvis from yesterday  TECHNIQUE:   Real-time ultrasound of the right upper quadrant was performed with a curvilinear transducer with both volumetric sweeps and still imaging techniques  FINDINGS: PANCREAS:  Visualized portions of the pancreas are unremarkable  AORTA AND IVC:  Visualized portions are normal for patient age  LIVER: Size:  Mildly enlarged  The liver measures 20 cm in the midclavicular line  Contour:  Surface contour is smooth  Parenchyma:  Diffusely hyperechoic, consistent with fatty infiltration  No evidence of mass  Main portal vein patent with hepatopetal flow  GALLBLADDER: Small amount of biliary sludge  No gallstones  Gallbladder wall normal in thickness, measuring  3  mm  No pericholecystic fluid  Sonographer reports nonspecific mild tenderness over the gallbladder  BILE DUCTS: No intrahepatic or extrahepatic bile duct dilatation  CBD measures 3 mm  No evidence of choledocholithiasis  RIGHT KIDNEY: Right kidney measures 10 5 x 4 7 cm  No mass or hydronephrosis  8 mm interpolar calculus  ASCITES:   None  Impression: 1  Hepatic steatosis  2   Small amount of biliary sludge in the gallbladder  No cholelithiasis and no evidence of acute cholecystitis  3   Normal intrahepatic and extrahepatic bile ducts  4   Pancreas unremarkable  5   8 mm nonobstructing right renal calculus  Workstation performed: [id number]     Ct Abdomen Pelvis With Contrast    Result Date: 10/22/2019  Narrative: CT ABDOMEN AND PELVIS WITH IV CONTRAST INDICATION:   LLQ pain  COMPARISON:  CT renal stone 9/18/2019 TECHNIQUE:  CT examination of the abdomen and pelvis was performed  Axial, sagittal, and coronal 2D reformatted images were created from the source data and submitted for interpretation  Radiation dose length product (DLP) for this visit:  688 mGy-cm   This examination, like all CT scans performed in the [facility], was performed utilizing techniques to minimize radiation dose exposure, including the use of iterative reconstruction and automated exposure control  IV Contrast:  100 mL of iohexol (OMNIPAQUE) Enteric Contrast:  Enteric contrast was not administered  FINDINGS: ABDOMEN LOWER CHEST:  No clinically significant abnormality identified in the visualized lower chest  LIVER/BILIARY TREE:  Unremarkable  GALLBLADDER:  No calcified gallstones  No pericholecystic inflammatory change  SPLEEN:  Unremarkable  PANCREAS:  Unremarkable  ADRENAL GLANDS:  Unremarkable  KIDNEYS/URETERS:  No hydronephrosis  Nonobstructive 6 mm lower pole right renal calculus  Simple appearing lower pole right renal subcentimeter cyst  STOMACH AND BOWEL:  Unremarkable  APPENDIX:  No findings to suggest appendicitis   ABDOMINOPELVIC CAVITY:  No ascites or free intraperitoneal air  No lymphadenopathy  VESSELS:  Unremarkable for patient's age  PELVIS REPRODUCTIVE ORGANS:  Prostamegaly  URINARY BLADDER:  Unremarkable  ABDOMINAL WALL/INGUINAL REGIONS:  Uncomplicated fat-containing inguinal hernias  Uncomplicated containing paraumbilical hernia measuring 29 mm in greatest diameter OSSEOUS STRUCTURES:  No acute fracture or destructive osseous lesion  Impression: No acute inflammatory changes within the abdomen or the pelvis  Workstation performed: [id number]       Incidental Findings:   · None    Test Results Pending at Discharge (will require follow up):   · As per After Visit Summary     Outpatient Tests Requested:  · Lipid profile    Complications:  Refer to hospital course and above listed diagnosis related plan, if any    Hospital Course:     Kasey Mathews is a 60 y o  male patient with past medical history of diabetes mellitus type 2, glaucoma, dyslipidemia who originally presented to the hospital on 10/22/2019 due to abdominal pain  Patient states he developed abdominal pain about 2 days ago  Pain is intermittent with no precipitating or exacerbating factors  Pain is located in the epigastric to right upper quadrant areas  Felt like pressure bandlike at times  There is no associated nausea, vomiting, fevers or chills  The pain worsened today prompting him to come to the emergency department  In the ER labs were unremarkable aside from an elevated lipase of 1700  CT of the abdomen pelvis with no acute findings  Of note patient was recently hospitalized for E coli bacteremia  He still needs outpatient follow-up  Patient is admitted for observation  Please see above list of diagnoses and related plan for additional information  Condition at Discharge: stable     Discharge Day Visit / Exam:     Subjective:   Tolerating low-fat diet  Denies any abdominal pain nausea vomiting  Denies any chest pain shortness of breath  Denies any urinary complaints    Vitals: Blood Pressure: 113/63 (10/24/19 0807)  Pulse: 68 (10/24/19 0807)  Temperature: 97 8 °F (36 6 °C) (10/24/19 0807)  Temp Source: Oral (10/24/19 0807)  Respirations: 18 (10/24/19 0807)  Height: 5' 9 5" (176 5 cm) (10/22/19 2002)  Weight - Scale: 92 5 kg (203 lb 14 8 oz) (10/22/19 2002)  SpO2: 98 % (10/24/19 0807)  Exam:   Physical Exam   Constitutional: He is oriented to person, place, and time  He appears well-developed  No distress  HENT:   Head: Normocephalic and atraumatic  Eyes: Pupils are equal, round, and reactive to light  Conjunctivae are normal    Neck: Normal range of motion  Neck supple  Cardiovascular: Normal rate, regular rhythm and normal heart sounds  Pulmonary/Chest: Effort normal  No respiratory distress  He has no wheezes  He has no rhonchi  He has no rales  He exhibits no tenderness  Abdominal: Soft  Bowel sounds are normal  He exhibits no distension  There is no tenderness  There is no rebound and no guarding  Obese   Musculoskeletal: Normal range of motion  He exhibits no edema  Neurological: He is alert and oriented to person, place, and time  No cranial nerve deficit  Skin: Skin is warm and dry  No rash noted  Psychiatric: He has a normal mood and affect  Discharge instructions/Information to patient and family:(Discharge Medications and Follow up):   See after visit summary for information provided to patient and family  Provisions for Follow-Up Care:  See after visit summary for information related to follow-up care and any pertinent home health orders  Disposition: Home    Planned Readmission:  No     Discharge Statement:  I spent 45 minutes discharging the patient  This time was spent on the day of discharge  I had direct contact with the patient on the day of discharge   Greater than 50% of the total time was spent examining patient, answering all patient questions, arranging and discussing plan of care with patient as well as directly providing post-discharge instructions  Additional time then spent on discharge activities  Coordinated with GI at the time of discharge  Discharge Medications:  See after visit summary for reconciled discharge medications provided to patient and family  ** Please Note: "This note has been constructed using a voice recognition system  Therefore there may be syntax, spelling, and/or grammatical errors   Please call if you have any questions  "**

## 2021-04-03 ENCOUNTER — APPOINTMENT (OUTPATIENT)
Dept: LAB | Facility: HOSPITAL | Age: 66
End: 2021-04-03
Payer: COMMERCIAL

## 2021-04-03 ENCOUNTER — OFFICE VISIT (OUTPATIENT)
Dept: URGENT CARE | Facility: CLINIC | Age: 66
End: 2021-04-03
Payer: COMMERCIAL

## 2021-04-03 VITALS — TEMPERATURE: 96.5 F | OXYGEN SATURATION: 98 % | HEART RATE: 104 BPM | RESPIRATION RATE: 18 BRPM

## 2021-04-03 DIAGNOSIS — R05.9 COUGH: ICD-10-CM

## 2021-04-03 DIAGNOSIS — B34.9 ACUTE VIRAL DISEASE: Primary | ICD-10-CM

## 2021-04-03 PROCEDURE — 99213 OFFICE O/P EST LOW 20 MIN: CPT | Performed by: PHYSICIAN ASSISTANT

## 2021-04-03 PROCEDURE — 87635 SARS-COV-2 COVID-19 AMP PRB: CPT | Performed by: PHYSICIAN ASSISTANT

## 2021-04-03 NOTE — PROGRESS NOTES
[address]      NAME: Maria Eugenia Sarmiento is a 63 y o  male  : 1955    MRN: [id number]  DATE: April 3, 2021  TIME: 2:12 PM    Assessment and Plan   Acute viral disease [B34 9]  1  Acute viral disease     2  Cough  Novel Coron[facility]Covid-19),[facility]       Patient Instructions   Acute Viral Illness  Likely upper respiratory infection but patient requested COVID testing  COVID swab performed, mychart or call for results in 2-3 days  Self isolation until results received  Recommend mucinex DM for cough  Rest, fluids and supportive care  May benefit from a cool mist humidifier on night stand  Tylenol/ibuprofen as needed for pain/fever  Follow up with PCP in 3-5 days  Proceed to  ER if symptoms worsen  Chief Complaint     Chief Complaint   Patient presents with    Headache     Patient complains of headache, dental pain, joint pains started a week ago  Fever last week when symptoms began but nothing since  History of Present Illness       Phillip a 79-year-old male who presents to clinic complaining of fatigue, myalgias, nasal congestion, and cough x1 week  Over the last 2 days he also noted increasing joint pain and sore throat  He attributes the sore throat to the cough  He describes the cough as only occasional and producing a clear to white sputum  He denies any confirmed COVID positive exposure, however he thinks his son's friend was positive but never tested  He denies any fever, chills, shortness of breath, ear pain, nausea, vomiting, diarrhea, loss of taste or smell, or recent travel  Review of Systems   Review of Systems   Constitutional: Positive for fatigue  Negative for chills and fever  HENT: Positive for congestion, rhinorrhea and sore throat  Negative for ear pain, sinus pressure and sinus pain  Respiratory: Positive for cough  Negative for shortness of breath  Cardiovascular: Negative for chest pain     Gastrointestinal: Negative for diarrhea, nausea and vomiting  Musculoskeletal: Positive for arthralgias and myalgias  Neurological: Negative for headaches  Current Medications       Current Outpatient Medications:     metFORMIN (GLUCOPHAGE) 500 mg tablet, Take 500 mg by mouth 2 (two) times a day with meals, Disp: , Rfl:     ezetimibe (ZETIA) 10 mg tablet, Take 1 tablet (10 mg total) by mouth daily (Patient not taking: Reported on 4/3/2021), Disp: , Rfl: 0    Prenatal Vit-Fe Fumarate-FA (M-VIT PO), Take by mouth, Disp: , Rfl:     tamsulosin (FLOMAX) 0 4 mg, Take 1 capsule (0 4 mg total) by mouth daily with dinner (Patient not taking: Reported on 4/3/2021), Disp: 30 capsule, Rfl: 0    Current Allergies     Allergies as of 04/03/2021 - Reviewed 04/03/2021   Allergen Reaction Noted    Bactrim [sulfamethoxazole-trimethoprim]  11/11/2019    Cat hair extract  12/03/2008    Dust mite extract  12/03/2008    Casey grass pollen allergen  12/03/2008    Tree extract  12/03/2008            The following portions of the patient's history were reviewed and updated as appropriate: allergies, current medications, past family history, past medical history, past social history, past surgical history and problem list      Past Medical History:   Diagnosis Date    BPH (benign prostatic hyperplasia)     Colon polyp     Diabetes mellitus ([facility] [address] )     Gallbladder attack     has sludge    Glaucoma     Pancreatitis        Past Surgical History:   Procedure Laterality Date    COLONOSCOPY      EYE SURGERY         Family History   Problem Relation Age of Onset    Diabetes Mother     Heart attack Mother     No Known Problems Brother     No Known Problems Brother     No Known Problems Brother          Medications have been verified  Objective   Pulse 104   Temp (!) 96 5 °F (35 8 °C) (Tympanic)   Resp 18   SpO2 98%   No LMP for male patient  Physical Exam     Physical Exam  Vitals signs and nursing note reviewed     Constitutional: General: He is not in acute distress  Appearance: Normal appearance  He is not ill-appearing  Cardiovascular:      Rate and Rhythm: Normal rate and regular rhythm  Heart sounds: Normal heart sounds  Pulmonary:      Effort: Pulmonary effort is normal       Breath sounds: Normal breath sounds  Neurological:      Mental Status: He is alert and oriented to person, place, and time     Psychiatric:         Mood and Affect: Mood normal          Behavior: Behavior normal

## 2021-04-03 NOTE — PATIENT INSTRUCTIONS
Acute Viral Illness  Likely upper respiratory infection but patient requested COVID testing  COVID swab performed, mychart or call for results in 2-3 days  Self isolation until results received  Recommend mucinex DM for cough  Rest, fluids and supportive care  May benefit from a cool mist humidifier on night stand  Tylenol/ibuprofen as needed for pain/fever  Follow up with PCP in 3-5 days  Proceed to  ER if symptoms worsen  [address]    Your healthcare provider and/or public health staff have evaluated you and have determined that you do not need to remain in the hospital at this time  At this time you can be isolated at home where you will be monitored by staff from your local or state health department  You should carefully follow the prevention and isolation steps below until a healthcare provider or local or state health department says that you can return to your normal activities  Stay home except to get medical care    People who are mildly ill with COVID-19 are able to isolate at home during their illness  You should restrict activities outside your home, except for getting medical care  Do not go to work, school, or public areas  Avoid using public transportation, ride-sharing, or taxis  Separate yourself from other people and animals in your home    People: As much as possible, you should stay in a specific room and away from other people in your home  Also, you should use a separate bathroom, if available  Animals: You should restrict contact with pets and other animals while you are sick with COVID-19, just like you would around other people  Although there have not been reports of pets or other animals becoming sick with COVID-19, it is still recommended that people sick with COVID-19 limit contact with animals until more information is known about the virus  When possible, have another member of your household care for your animals while you are sick   If you are sick with COVID-19, avoid contact with your pet, including petting, snuggling, being kissed or licked, and sharing food  If you must care for your pet or be around animals while you are sick, wash your hands before and after you interact with pets and wear a facemask  See COVID-19 and Animals for more information  Call ahead before visiting your doctor    If you have a medical appointment, call the healthcare provider and tell them that you have or may have COVID-19  This will help the healthcare providers office take steps to keep other people from getting infected or exposed  Wear a facemask    You should wear a facemask when you are around other people (e g , sharing a room or vehicle) or pets and before you enter a healthcare providers office  If you are not able to wear a facemask (for example, because it causes trouble breathing), then people who live with you should not stay in the same room with you, or they should wear a facemask if they enter your room  Cover your coughs and sneezes    Cover your mouth and nose with a tissue when you cough or sneeze  Throw used tissues in a lined trash can  Immediately wash your hands with soap and water for at least 20 seconds or, if soap and water are not available, clean your hands with an alcohol-based hand  that contains at least 60% alcohol  Clean your hands often    Wash your hands often with soap and water for at least 20 seconds, especially after blowing your nose, coughing, or sneezing; going to the bathroom; and before eating or preparing food  If soap and water are not readily available, use an alcohol-based hand  with at least 60% alcohol, covering all surfaces of your hands and rubbing them together until they feel dry  Soap and water are the best option if hands are visibly dirty  Avoid touching your eyes, nose, and mouth with unwashed hands      Avoid sharing personal household items    You should not share dishes, drinking glasses, cups, eating utensils, towels, or bedding with other people or pets in your home  After using these items, they should be washed thoroughly with soap and water  Clean all high-touch surfaces everyday    High touch surfaces include counters, tabletops, doorknobs, bathroom fixtures, toilets, phones, keyboards, tablets, and bedside tables  Also, clean any surfaces that may have blood, stool, or body fluids on them  Use a household cleaning spray or wipe, according to the label instructions  Labels contain instructions for safe and effective use of the cleaning product including precautions you should take when applying the product, such as wearing gloves and making sure you have good ventilation during use of the product  Monitor your symptoms    Seek prompt medical attention if your illness is worsening (e g , difficulty breathing)  Before seeking care, call your healthcare provider and tell them that you have, or are being evaluated for, COVID-19  Put on a facemask before you enter the facility  These steps will help the healthcare providers office to keep other people in the office or waiting room from getting infected or exposed  Ask your healthcare provider to call the local or [facility] health department  Persons who are placed under active monitoring or facilitated self-monitoring should follow instructions provided by their local health department or occupational health professionals, as appropriate  If you have a medical emergency and need to call 911, notify the dispatch personnel that you have, or are being evaluated for COVID-19  If possible, put on a facemask before emergency medical services arrive      Discontinuing home isolation    Patients with confirmed COVID-19 should remain under home isolation precautions until the following conditions are met:   - They have had no fever for at least 24 hours (that is one full day of no fever without the use medicine that reduces fevers)  AND  - other symptoms have improved (for example, when their cough or shortness of breath have improved)  AND  - If had mild or moderate illness, at least 10 days have passed since their symptoms first appeared or if severe illness (needed oxygen) or immunosuppressed, at least 20 days have passed since symptoms first appeared  Patients with confirmed COVID-19 should also notify close contacts (including their workplace) and ask that they self-quarantine  Currently, close contact is defined as being within 6 feet for 15 minutes or more from the period 24 hours starting 48 hours before symptom onset to the time at which the patient went into isolation  Close contacts of patients diagnosed with COVID-19 should be instructed by the patient to self-quarantine for 14 days from the last time of their last contact with the patient       Source: [employer]

## 2021-04-04 ENCOUNTER — TELEPHONE (OUTPATIENT)
Dept: URGENT CARE | Facility: CLINIC | Age: 66
End: 2021-04-04

## 2021-04-04 LAB — SARS-COV-2 RNA RESP QL NAA+PROBE: POSITIVE

## 2021-04-04 NOTE — TELEPHONE ENCOUNTER
Informed patient of positive COVID results  Patient states feeling ok, no better, no worse  Informed patient to make PCP aware of positive results  Aware needs to continue to quarantine for 10 days from symptom start and fever free for 24 hrs without medication

## 2022-04-04 ENCOUNTER — HOSPITAL ENCOUNTER (OUTPATIENT)
Dept: RADIOLOGY | Facility: HOSPITAL | Age: 67
Discharge: HOME | End: 2022-04-04
Payer: COMMERCIAL

## 2022-04-04 DIAGNOSIS — M54.9 BACK PAIN, UNSPECIFIED BACK LOCATION, UNSPECIFIED BACK PAIN LATERALITY, UNSPECIFIED CHRONICITY: ICD-10-CM

## 2022-04-04 DIAGNOSIS — R10.9 FLANK PAIN: ICD-10-CM

## 2022-04-04 PROCEDURE — 74018 RADEX ABDOMEN 1 VIEW: CPT

## 2022-04-04 PROCEDURE — 72110 X-RAY EXAM L-2 SPINE 4/>VWS: CPT

## 2024-01-20 ENCOUNTER — NEW PATIENT COMPREHENSIVE (OUTPATIENT)
Dept: URBAN - METROPOLITAN AREA CLINIC 54 | Facility: CLINIC | Age: 69
End: 2024-01-20

## 2024-01-20 DIAGNOSIS — H40.1133: ICD-10-CM

## 2024-01-20 PROCEDURE — 92020 GONIOSCOPY: CPT

## 2024-01-20 PROCEDURE — 92250 FUNDUS PHOTOGRAPHY W/I&R: CPT

## 2024-01-20 PROCEDURE — 76514 ECHO EXAM OF EYE THICKNESS: CPT

## 2024-01-20 PROCEDURE — 92004 COMPRE OPH EXAM NEW PT 1/>: CPT

## 2024-01-20 ASSESSMENT — VISUAL ACUITY
OS_CC: J2+3
OS_CC: 20/20-2
OU_CC: J1+
OD_CC: J18
OD_CC: 20/200

## 2024-01-20 ASSESSMENT — PACHYMETRY
OD_CT_UM: 586
OS_CT_UM: 531

## 2024-01-20 ASSESSMENT — TONOMETRY
OD_IOP_MMHG: 9
OS_IOP_MMHG: 10

## 2024-03-14 ENCOUNTER — FOLLOW UP (OUTPATIENT)
Dept: URBAN - METROPOLITAN AREA CLINIC 54 | Facility: CLINIC | Age: 69
End: 2024-03-14

## 2024-03-14 DIAGNOSIS — H40.1133: ICD-10-CM

## 2024-03-14 PROCEDURE — 92012 INTRM OPH EXAM EST PATIENT: CPT

## 2024-03-14 ASSESSMENT — VISUAL ACUITY
OS_SC: 20/30
OD_SC: 20/200

## 2024-03-14 ASSESSMENT — TONOMETRY
OS_IOP_MMHG: 10
OD_IOP_MMHG: 8

## 2024-04-08 ENCOUNTER — SURGERY/PROCEDURE (OUTPATIENT)
Dept: URBAN - METROPOLITAN AREA SURGICAL CENTER 32 | Facility: SURGICAL CENTER | Age: 69
End: 2024-04-08

## 2024-04-08 DIAGNOSIS — H40.1133: ICD-10-CM

## 2024-04-08 PROBLEM — Z98.890: Noted: 2024-04-08

## 2024-04-08 PROCEDURE — 66172 INCISION OF EYE: CPT

## 2024-04-09 ENCOUNTER — 1 DAY POST-OP (OUTPATIENT)
Dept: URBAN - METROPOLITAN AREA CLINIC 21 | Facility: CLINIC | Age: 69
End: 2024-04-09

## 2024-04-09 DIAGNOSIS — Z98.890: ICD-10-CM

## 2024-04-09 PROCEDURE — 99024 POSTOP FOLLOW-UP VISIT: CPT

## 2024-04-18 ENCOUNTER — 1 WEEK POST-OP (OUTPATIENT)
Dept: URBAN - METROPOLITAN AREA CLINIC 54 | Facility: CLINIC | Age: 69
End: 2024-04-18

## 2024-04-18 DIAGNOSIS — Z98.890: ICD-10-CM

## 2024-04-18 PROCEDURE — 99024 POSTOP FOLLOW-UP VISIT: CPT

## 2024-04-18 ASSESSMENT — TONOMETRY
OD_IOP_MMHG: 23
OD_IOP_MMHG: 24

## 2024-05-09 ENCOUNTER — 1 MONTH POST-OP (OUTPATIENT)
Dept: URBAN - METROPOLITAN AREA CLINIC 54 | Facility: CLINIC | Age: 69
End: 2024-05-09

## 2024-05-09 DIAGNOSIS — Z98.890: ICD-10-CM

## 2024-05-09 DIAGNOSIS — H40.1133: ICD-10-CM

## 2024-05-09 PROCEDURE — 68200 TREAT EYELID BY INJECTION: CPT

## 2024-05-09 PROCEDURE — 99024 POSTOP FOLLOW-UP VISIT: CPT | Mod: 25,58

## 2024-05-09 ASSESSMENT — TONOMETRY
OS_IOP_MMHG: 20
OD_IOP_MMHG: 27

## 2024-05-09 ASSESSMENT — VISUAL ACUITY: OS_SC: 20/30

## 2024-05-30 ENCOUNTER — FOLLOW UP (OUTPATIENT)
Dept: URBAN - METROPOLITAN AREA CLINIC 54 | Facility: CLINIC | Age: 69
End: 2024-05-30

## 2024-05-30 DIAGNOSIS — Z98.890: ICD-10-CM

## 2024-05-30 PROCEDURE — 99024 POSTOP FOLLOW-UP VISIT: CPT

## 2024-05-30 ASSESSMENT — TONOMETRY
OD_IOP_MMHG: 29
OS_IOP_MMHG: 19

## 2024-06-20 ENCOUNTER — FOLLOW UP (OUTPATIENT)
Dept: URBAN - METROPOLITAN AREA CLINIC 54 | Facility: CLINIC | Age: 69
End: 2024-06-20

## 2024-06-20 DIAGNOSIS — Z98.890: ICD-10-CM

## 2024-06-20 PROCEDURE — 99024 POSTOP FOLLOW-UP VISIT: CPT

## 2024-06-20 ASSESSMENT — TONOMETRY
OD_IOP_MMHG: 28
OS_IOP_MMHG: 16

## 2024-06-20 ASSESSMENT — VISUAL ACUITY: OS_SC: 20/20

## 2024-08-01 ENCOUNTER — FOLLOW UP (OUTPATIENT)
Dept: URBAN - METROPOLITAN AREA CLINIC 54 | Facility: CLINIC | Age: 69
End: 2024-08-01

## 2024-08-01 DIAGNOSIS — H40.1133: ICD-10-CM

## 2024-08-01 PROCEDURE — 99214 OFFICE O/P EST MOD 30 MIN: CPT

## 2024-08-01 ASSESSMENT — TONOMETRY
OS_IOP_MMHG: 14
OD_IOP_MMHG: 9

## 2024-08-01 ASSESSMENT — VISUAL ACUITY: OS_SC: 20/20

## 2024-10-03 ENCOUNTER — FOLLOW UP (OUTPATIENT)
Dept: URBAN - METROPOLITAN AREA CLINIC 54 | Facility: CLINIC | Age: 69
End: 2024-10-03

## 2024-10-03 DIAGNOSIS — H40.1133: ICD-10-CM

## 2024-10-03 PROCEDURE — 92133 CPTRZD OPH DX IMG PST SGM ON: CPT

## 2024-10-03 PROCEDURE — 99214 OFFICE O/P EST MOD 30 MIN: CPT

## 2024-10-03 PROCEDURE — G2211 COMPLEX E/M VISIT ADD ON: HCPCS

## 2024-10-03 ASSESSMENT — VISUAL ACUITY
OS_SC: 20/20
OU_CC: J1+

## 2024-10-03 ASSESSMENT — TONOMETRY
OD_IOP_MMHG: 12
OS_IOP_MMHG: 16

## 2024-12-19 ENCOUNTER — FOLLOW UP (OUTPATIENT)
Dept: URBAN - METROPOLITAN AREA CLINIC 54 | Facility: CLINIC | Age: 69
End: 2024-12-19

## 2024-12-19 DIAGNOSIS — H02.401: ICD-10-CM

## 2024-12-19 DIAGNOSIS — H40.1133: ICD-10-CM

## 2024-12-19 PROCEDURE — 92020 GONIOSCOPY: CPT

## 2024-12-19 PROCEDURE — 92012 INTRM OPH EXAM EST PATIENT: CPT

## 2024-12-19 PROCEDURE — 92285 EXTERNAL OCULAR PHOTOGRAPHY: CPT | Mod: 57,59

## 2024-12-19 ASSESSMENT — TONOMETRY
OS_IOP_MMHG: 13
OD_IOP_MMHG: 19

## 2024-12-19 ASSESSMENT — VISUAL ACUITY: OS_CC: 20/20

## 2025-02-10 ENCOUNTER — SURGERY/PROCEDURE (OUTPATIENT)
Dept: URBAN - METROPOLITAN AREA SURGICAL CENTER 32 | Facility: SURGICAL CENTER | Age: 70
End: 2025-02-10

## 2025-02-13 ENCOUNTER — POST-OP CHECK (OUTPATIENT)
Dept: URBAN - METROPOLITAN AREA CLINIC 54 | Facility: CLINIC | Age: 70
End: 2025-02-13

## 2025-02-13 DIAGNOSIS — H40.1133: ICD-10-CM

## 2025-02-13 DIAGNOSIS — H02.401: ICD-10-CM

## 2025-02-13 PROCEDURE — 99024 POSTOP FOLLOW-UP VISIT: CPT

## 2025-02-13 ASSESSMENT — VISUAL ACUITY: OS_CC: 20/20

## 2025-02-13 ASSESSMENT — TONOMETRY
OD_IOP_MMHG: 24
OS_IOP_MMHG: 17

## 2025-03-20 ENCOUNTER — POST-OP CHECK (OUTPATIENT)
Dept: URBAN - METROPOLITAN AREA CLINIC 54 | Facility: CLINIC | Age: 70
End: 2025-03-20

## 2025-03-20 DIAGNOSIS — H40.1133: ICD-10-CM

## 2025-03-20 DIAGNOSIS — H02.401: ICD-10-CM

## 2025-03-20 PROCEDURE — 99024 POSTOP FOLLOW-UP VISIT: CPT

## 2025-03-20 ASSESSMENT — VISUAL ACUITY: OS_CC: 20/20

## 2025-03-20 ASSESSMENT — TONOMETRY
OD_IOP_MMHG: 24
OS_IOP_MMHG: 15

## 2025-05-01 ENCOUNTER — POST-OP CHECK (OUTPATIENT)
Dept: URBAN - METROPOLITAN AREA CLINIC 54 | Facility: CLINIC | Age: 70
End: 2025-05-01

## 2025-05-01 ASSESSMENT — VISUAL ACUITY: OS_CC: 20/20

## 2025-05-01 ASSESSMENT — TONOMETRY
OS_IOP_MMHG: 15
OD_IOP_MMHG: 23
OD_IOP_MMHG: 21
OS_IOP_MMHG: 14

## 2025-06-26 ENCOUNTER — FOLLOW UP (OUTPATIENT)
Dept: URBAN - METROPOLITAN AREA CLINIC 54 | Facility: CLINIC | Age: 70
End: 2025-06-26

## 2025-06-26 DIAGNOSIS — H40.1133: ICD-10-CM

## 2025-06-26 DIAGNOSIS — H02.401: ICD-10-CM

## 2025-06-26 PROCEDURE — 92020 GONIOSCOPY: CPT

## 2025-06-26 PROCEDURE — 92012 INTRM OPH EXAM EST PATIENT: CPT

## 2025-06-26 ASSESSMENT — TONOMETRY
OD_IOP_MMHG: 26
OD_IOP_MMHG: 21
OS_IOP_MMHG: 11

## 2025-06-26 ASSESSMENT — VISUAL ACUITY: OS_SC: 20/20

## (undated) RX ORDER — BIMATOPROST 0.1 MG/ML: 1 SOLUTION/ DROPS OPHTHALMIC EVERY EVENING